# Patient Record
Sex: FEMALE | Race: WHITE | NOT HISPANIC OR LATINO
[De-identification: names, ages, dates, MRNs, and addresses within clinical notes are randomized per-mention and may not be internally consistent; named-entity substitution may affect disease eponyms.]

---

## 2017-07-26 ENCOUNTER — RX RENEWAL (OUTPATIENT)
Age: 77
End: 2017-07-26

## 2018-01-11 ENCOUNTER — EMERGENCY (EMERGENCY)
Facility: HOSPITAL | Age: 78
LOS: 1 days | Discharge: HOME | End: 2018-01-11
Attending: EMERGENCY MEDICINE | Admitting: EMERGENCY MEDICINE
Payer: MEDICARE

## 2018-01-11 VITALS
DIASTOLIC BLOOD PRESSURE: 78 MMHG | OXYGEN SATURATION: 100 % | HEART RATE: 86 BPM | SYSTOLIC BLOOD PRESSURE: 153 MMHG | RESPIRATION RATE: 17 BRPM

## 2018-01-11 VITALS
OXYGEN SATURATION: 99 % | SYSTOLIC BLOOD PRESSURE: 159 MMHG | RESPIRATION RATE: 18 BRPM | DIASTOLIC BLOOD PRESSURE: 77 MMHG | HEART RATE: 82 BPM | TEMPERATURE: 97 F

## 2018-01-11 DIAGNOSIS — Z96.659 PRESENCE OF UNSPECIFIED ARTIFICIAL KNEE JOINT: Chronic | ICD-10-CM

## 2018-01-11 DIAGNOSIS — Z96.643 PRESENCE OF ARTIFICIAL HIP JOINT, BILATERAL: Chronic | ICD-10-CM

## 2018-01-11 LAB
ALBUMIN SERPL ELPH-MCNC: 4.4 G/DL — SIGNIFICANT CHANGE UP (ref 3.3–5)
ALP SERPL-CCNC: 64 U/L — SIGNIFICANT CHANGE UP (ref 40–120)
ALT FLD-CCNC: 25 U/L — SIGNIFICANT CHANGE UP (ref 4–33)
APPEARANCE UR: CLEAR — SIGNIFICANT CHANGE UP
APTT BLD: 41.9 SEC — HIGH (ref 27.5–37.4)
AST SERPL-CCNC: 30 U/L — SIGNIFICANT CHANGE UP (ref 4–32)
B PERT DNA SPEC QL NAA+PROBE: SIGNIFICANT CHANGE UP
BASE EXCESS BLDV CALC-SCNC: 3.3 MMOL/L — SIGNIFICANT CHANGE UP
BASOPHILS # BLD AUTO: 0.04 K/UL — SIGNIFICANT CHANGE UP (ref 0–0.2)
BASOPHILS NFR BLD AUTO: 0.5 % — SIGNIFICANT CHANGE UP (ref 0–2)
BILIRUB SERPL-MCNC: 0.3 MG/DL — SIGNIFICANT CHANGE UP (ref 0.2–1.2)
BILIRUB UR-MCNC: NEGATIVE — SIGNIFICANT CHANGE UP
BLOOD GAS VENOUS - CREATININE: 0.75 MG/DL — SIGNIFICANT CHANGE UP (ref 0.5–1.3)
BLOOD UR QL VISUAL: NEGATIVE — SIGNIFICANT CHANGE UP
BUN SERPL-MCNC: 15 MG/DL — SIGNIFICANT CHANGE UP (ref 7–23)
C PNEUM DNA SPEC QL NAA+PROBE: NOT DETECTED — SIGNIFICANT CHANGE UP
CALCIUM SERPL-MCNC: 9.4 MG/DL — SIGNIFICANT CHANGE UP (ref 8.4–10.5)
CHLORIDE BLDV-SCNC: 110 MMOL/L — HIGH (ref 96–108)
CHLORIDE SERPL-SCNC: 107 MMOL/L — SIGNIFICANT CHANGE UP (ref 98–107)
CO2 SERPL-SCNC: 28 MMOL/L — SIGNIFICANT CHANGE UP (ref 22–31)
COLOR SPEC: YELLOW — SIGNIFICANT CHANGE UP
CREAT SERPL-MCNC: 0.86 MG/DL — SIGNIFICANT CHANGE UP (ref 0.5–1.3)
EOSINOPHIL # BLD AUTO: 0.09 K/UL — SIGNIFICANT CHANGE UP (ref 0–0.5)
EOSINOPHIL NFR BLD AUTO: 1.2 % — SIGNIFICANT CHANGE UP (ref 0–6)
FLUAV H1 2009 PAND RNA SPEC QL NAA+PROBE: NOT DETECTED — SIGNIFICANT CHANGE UP
FLUAV H1 RNA SPEC QL NAA+PROBE: NOT DETECTED — SIGNIFICANT CHANGE UP
FLUAV H3 RNA SPEC QL NAA+PROBE: NOT DETECTED — SIGNIFICANT CHANGE UP
FLUAV SUBTYP SPEC NAA+PROBE: SIGNIFICANT CHANGE UP
FLUBV RNA SPEC QL NAA+PROBE: NOT DETECTED — SIGNIFICANT CHANGE UP
GAS PNL BLDV: 140 MMOL/L — SIGNIFICANT CHANGE UP (ref 136–146)
GLUCOSE BLDV-MCNC: 88 — SIGNIFICANT CHANGE UP (ref 70–99)
GLUCOSE SERPL-MCNC: 88 MG/DL — SIGNIFICANT CHANGE UP (ref 70–99)
GLUCOSE UR-MCNC: NEGATIVE — SIGNIFICANT CHANGE UP
HADV DNA SPEC QL NAA+PROBE: NOT DETECTED — SIGNIFICANT CHANGE UP
HCO3 BLDV-SCNC: 25 MMOL/L — SIGNIFICANT CHANGE UP (ref 20–27)
HCOV 229E RNA SPEC QL NAA+PROBE: NOT DETECTED — SIGNIFICANT CHANGE UP
HCOV HKU1 RNA SPEC QL NAA+PROBE: NOT DETECTED — SIGNIFICANT CHANGE UP
HCOV NL63 RNA SPEC QL NAA+PROBE: NOT DETECTED — SIGNIFICANT CHANGE UP
HCOV OC43 RNA SPEC QL NAA+PROBE: NOT DETECTED — SIGNIFICANT CHANGE UP
HCT VFR BLD CALC: 39.5 % — SIGNIFICANT CHANGE UP (ref 34.5–45)
HCT VFR BLDV CALC: 40.3 % — SIGNIFICANT CHANGE UP (ref 34.5–45)
HGB BLD-MCNC: 12.9 G/DL — SIGNIFICANT CHANGE UP (ref 11.5–15.5)
HGB BLDV-MCNC: 13.1 G/DL — SIGNIFICANT CHANGE UP (ref 11.5–15.5)
HMPV RNA SPEC QL NAA+PROBE: NOT DETECTED — SIGNIFICANT CHANGE UP
HPIV1 RNA SPEC QL NAA+PROBE: NOT DETECTED — SIGNIFICANT CHANGE UP
HPIV2 RNA SPEC QL NAA+PROBE: NOT DETECTED — SIGNIFICANT CHANGE UP
HPIV3 RNA SPEC QL NAA+PROBE: NOT DETECTED — SIGNIFICANT CHANGE UP
HPIV4 RNA SPEC QL NAA+PROBE: NOT DETECTED — SIGNIFICANT CHANGE UP
IMM GRANULOCYTES # BLD AUTO: 0.03 # — SIGNIFICANT CHANGE UP
IMM GRANULOCYTES NFR BLD AUTO: 0.4 % — SIGNIFICANT CHANGE UP (ref 0–1.5)
INR BLD: 1.96 — HIGH (ref 0.88–1.17)
KETONES UR-MCNC: NEGATIVE — SIGNIFICANT CHANGE UP
LACTATE BLDV-MCNC: 0.9 MMOL/L — SIGNIFICANT CHANGE UP (ref 0.5–2)
LEUKOCYTE ESTERASE UR-ACNC: HIGH
LYMPHOCYTES # BLD AUTO: 1.45 K/UL — SIGNIFICANT CHANGE UP (ref 1–3.3)
LYMPHOCYTES # BLD AUTO: 18.6 % — SIGNIFICANT CHANGE UP (ref 13–44)
M PNEUMO DNA SPEC QL NAA+PROBE: NOT DETECTED — SIGNIFICANT CHANGE UP
MAGNESIUM SERPL-MCNC: 1.9 MG/DL — SIGNIFICANT CHANGE UP (ref 1.6–2.6)
MCHC RBC-ENTMCNC: 29.9 PG — SIGNIFICANT CHANGE UP (ref 27–34)
MCHC RBC-ENTMCNC: 32.7 % — SIGNIFICANT CHANGE UP (ref 32–36)
MCV RBC AUTO: 91.4 FL — SIGNIFICANT CHANGE UP (ref 80–100)
MONOCYTES # BLD AUTO: 0.48 K/UL — SIGNIFICANT CHANGE UP (ref 0–0.9)
MONOCYTES NFR BLD AUTO: 6.2 % — SIGNIFICANT CHANGE UP (ref 2–14)
NEUTROPHILS # BLD AUTO: 5.69 K/UL — SIGNIFICANT CHANGE UP (ref 1.8–7.4)
NEUTROPHILS NFR BLD AUTO: 73.1 % — SIGNIFICANT CHANGE UP (ref 43–77)
NITRITE UR-MCNC: NEGATIVE — SIGNIFICANT CHANGE UP
NON-SQ EPI CELLS # UR AUTO: <1 — SIGNIFICANT CHANGE UP
NRBC # FLD: 0 — SIGNIFICANT CHANGE UP
PCO2 BLDV: 53 MMHG — HIGH (ref 41–51)
PH BLDV: 7.35 PH — SIGNIFICANT CHANGE UP (ref 7.32–7.43)
PH UR: 7 — SIGNIFICANT CHANGE UP (ref 4.6–8)
PHOSPHATE SERPL-MCNC: 3.3 MG/DL — SIGNIFICANT CHANGE UP (ref 2.5–4.5)
PLATELET # BLD AUTO: 240 K/UL — SIGNIFICANT CHANGE UP (ref 150–400)
PMV BLD: 9.7 FL — SIGNIFICANT CHANGE UP (ref 7–13)
PO2 BLDV: 28 MMHG — LOW (ref 35–40)
POTASSIUM BLDV-SCNC: 3.9 MMOL/L — SIGNIFICANT CHANGE UP (ref 3.4–4.5)
POTASSIUM SERPL-MCNC: 4.4 MMOL/L — SIGNIFICANT CHANGE UP (ref 3.5–5.3)
POTASSIUM SERPL-SCNC: 4.4 MMOL/L — SIGNIFICANT CHANGE UP (ref 3.5–5.3)
PROT SERPL-MCNC: 6.6 G/DL — SIGNIFICANT CHANGE UP (ref 6–8.3)
PROT UR-MCNC: NEGATIVE MG/DL — SIGNIFICANT CHANGE UP
PROTHROM AB SERPL-ACNC: 22.9 SEC — HIGH (ref 9.8–13.1)
RBC # BLD: 4.32 M/UL — SIGNIFICANT CHANGE UP (ref 3.8–5.2)
RBC # FLD: 13 % — SIGNIFICANT CHANGE UP (ref 10.3–14.5)
RBC CASTS # UR COMP ASSIST: SIGNIFICANT CHANGE UP (ref 0–?)
RSV RNA SPEC QL NAA+PROBE: NOT DETECTED — SIGNIFICANT CHANGE UP
RV+EV RNA SPEC QL NAA+PROBE: NOT DETECTED — SIGNIFICANT CHANGE UP
SAO2 % BLDV: 45.2 % — LOW (ref 60–85)
SODIUM SERPL-SCNC: 145 MMOL/L — SIGNIFICANT CHANGE UP (ref 135–145)
SP GR SPEC: 1.02 — SIGNIFICANT CHANGE UP (ref 1–1.04)
SQUAMOUS # UR AUTO: SIGNIFICANT CHANGE UP
TSH SERPL-MCNC: 1.85 UIU/ML — SIGNIFICANT CHANGE UP (ref 0.27–4.2)
UROBILINOGEN FLD QL: NORMAL MG/DL — SIGNIFICANT CHANGE UP
WBC # BLD: 7.78 K/UL — SIGNIFICANT CHANGE UP (ref 3.8–10.5)
WBC # FLD AUTO: 7.78 K/UL — SIGNIFICANT CHANGE UP (ref 3.8–10.5)
WBC UR QL: SIGNIFICANT CHANGE UP (ref 0–?)

## 2018-01-11 PROCEDURE — 70450 CT HEAD/BRAIN W/O DYE: CPT | Mod: 26

## 2018-01-11 PROCEDURE — 71045 X-RAY EXAM CHEST 1 VIEW: CPT | Mod: 26

## 2018-01-11 PROCEDURE — 72125 CT NECK SPINE W/O DYE: CPT | Mod: 26

## 2018-01-11 PROCEDURE — 99285 EMERGENCY DEPT VISIT HI MDM: CPT | Mod: GC

## 2018-01-11 NOTE — ED ADULT TRIAGE NOTE - CHIEF COMPLAINT QUOTE
pt. states she felt an episode of weakness while standing at the sink at about 7am, states she sat down, felt tingling on the L side of her face and down the L arm.  Did not fall or LOC.  PMHx TIA's, HLD, HTN.  No arm drift, facial droop, slurred speech noted in triage.  Pt. on coumadin for afib.  Stroke eval conducted by MD Cabot in triage, no code stroke called. pt. states she felt an episode of weakness while standing at the sink at about 7am, states she sat down, felt tingling on the L side of her face and down the L arm.  Did not fall or LOC.  PMHx TIA's, HLD, HTN.  No arm drift, facial droop, slurred speech noted in triage.  Pt. on coumadin for afib.  Stroke eval conducted by MD Cabot in triage, no code stroke called.  .

## 2018-01-11 NOTE — ED PROVIDER NOTE - MEDICAL DECISION MAKING DETAILS
77F with L face tingling, L arm tingling, headache, generalized weakness.  Symptoms resolving.  On coumadin.  No code stroke called.  TPA contraindications.  Feels well otherwise.  H/o similar symptoms in past deemed likely c-spine radiculopathy.  Will get ct head, cbc, cmp, vbg, coags, ua, cxr, ekg.  Will d/w curtis and primary medical doctor.

## 2018-01-11 NOTE — ED PROVIDER NOTE - OBJECTIVE STATEMENT
77F with PMH Hypertension, hl, afib on coumadin, h/o multiple episodes of L facial tingling and L arm tingling in the past, negative stroke workups, private neurologist has told the patient he feels this is more likely related to cervical spinal issues than a TIA, who presents with generalized weakness since 7:50, L facial and L arm tingling sensation since 8:10 that are now resolving.  Also complaining of diffuse 5/10 headache that is now resolving as well.  Had same symptoms last time came to ED with negative CVA lam.  Also complaining of burning with urination.  Denies vision change, speech change focal weakness, fever, chills, chest pain, shortness of breath, cough, palpitations, abdominal pain, diarrhea, constipation, n/v.    primary medical doctor: Brunner [phone]  Neuro: Calvin Bush

## 2018-01-11 NOTE — ED PROVIDER NOTE - PLAN OF CARE
1) You were here for tingling and weakness.    2) Take your current medications as prescribed.     3) Follow up with your primary doctor and your neurologist for further evaluation and to answer any questions you have.    4) Return to the emergency department if you experience worsening symptoms, pain, fever, chills, nausea, vomiting or other concerning symptoms.

## 2018-01-11 NOTE — ED ADULT NURSE NOTE - OBJECTIVE STATEMENT
Pt. A&Ox4, c/o generalized weakness starting around 7:45am today. Pt. states she was finishing breakfast, stood up to walk and felt like she had to sit back down otherwise she would fall. Denies any dizziness, cp or sob at the time. No LOC. c/o mild headache at this time and "just doesn't feel herself". Able to ambulate to bathroom but with some assistance.  h/o HTN, HLD and TIA (no residual) #20g IV placed in left AC, labs sent. MD at bedside for eval. VS done as charted, breathing well on RA. Will continue to monitor.

## 2018-01-11 NOTE — ED PROVIDER NOTE - ATTENDING CONTRIBUTION TO CARE
I performed a face to face bedside interview with patient regarding history of present illness, review of symptoms and past medical history. I completed an independent physical exam.  I have discussed patient's plan of care.   I agree with note as stated above, having amended the EMR as needed to reflect my findings. I have discussed the assessment and plan of care.  This includes during the time I functioned as the attending physician for this patient.  Attending Contribution to Care: agree with plan of resident. pt p/w vague weakness, recurrent hx of paresthesia. attributed to cervical stenosis. pt labs wnl. ct head unchanged. pt stable. symptomatic improvement. no current paresthesia. will f/u with neuro and pmd. stable for d/c.

## 2018-01-11 NOTE — ED PROVIDER NOTE - CARE PLAN
Principal Discharge DX:	Paresthesia Principal Discharge DX:	Paresthesia  Instructions for follow-up, activity and diet:	1) You were here for tingling and weakness.    2) Take your current medications as prescribed.     3) Follow up with your primary doctor and your neurologist for further evaluation and to answer any questions you have.    4) Return to the emergency department if you experience worsening symptoms, pain, fever, chills, nausea, vomiting or other concerning symptoms.

## 2018-01-11 NOTE — ED PROVIDER NOTE - PROGRESS NOTE DETAILS
Cabot: Called to triage to eval possible stroke.  77F with PMH of multiple episodes of L forehead tingling and L arm tingling in the past, negative stroke workups, private neurologist has told the patient he feels this is more likely related to cervical spinal issues than a TIA, also on coumadin for Afib, who came in with similar symptoms that started at 8am.  Reports tingling to the L forehead and L shoulder, already improving.  No vision changes, speech changes, HA, N/V, weakness.  On exam, she is alert and conversant, PERRLA, no nystagmus, symmetric facial movements, no numbness of any part of body on exam (including face and arm), 5/5 strength.  Unlikely stroke.  Also, with resolving complaints and on coumadin - both contraindications to tPA.  Will not call code stroke.

## 2018-01-11 NOTE — ED ADULT NURSE NOTE - CHIEF COMPLAINT QUOTE
pt. states she felt an episode of weakness while standing at the sink at about 7am, states she sat down, felt tingling on the L side of her face and down the L arm.  Did not fall or LOC.  PMHx TIA's, HLD, HTN.  No arm drift, facial droop, slurred speech noted in triage.  Pt. on coumadin for afib.  Stroke eval conducted by MD Cabot in triage, no code stroke called.  .

## 2018-01-12 LAB
BACTERIA UR CULT: SIGNIFICANT CHANGE UP
SPECIMEN SOURCE: SIGNIFICANT CHANGE UP
SPECIMEN SOURCE: SIGNIFICANT CHANGE UP

## 2018-01-16 LAB — BACTERIA BLD CULT: SIGNIFICANT CHANGE UP

## 2018-12-06 NOTE — ED ADULT TRIAGE NOTE - AS TEMP SITE
oral Bilateral Helical Rim Advancement Flap Text: The defect edges were debeveled with a #15 blade scalpel.  Given the location of the defect and the proximity to free margins (helical rim) a bilateral helical rim advancement flap was deemed most appropriate.  Using a sterile surgical marker, the appropriate advancement flaps were drawn incorporating the defect and placing the expected incisions between the helical rim and antihelix where possible.  The area thus outlined was incised through and through with a #15 scalpel blade.  With a skin hook and iris scissors, the flaps were gently and sharply undermined and freed up.

## 2019-02-05 ENCOUNTER — RX RENEWAL (OUTPATIENT)
Age: 79
End: 2019-02-05

## 2019-09-14 ENCOUNTER — EMERGENCY (EMERGENCY)
Facility: HOSPITAL | Age: 79
LOS: 1 days | Discharge: HOME | End: 2019-09-14
Attending: STUDENT IN AN ORGANIZED HEALTH CARE EDUCATION/TRAINING PROGRAM | Admitting: STUDENT IN AN ORGANIZED HEALTH CARE EDUCATION/TRAINING PROGRAM
Payer: MEDICARE

## 2019-09-14 VITALS
HEART RATE: 79 BPM | TEMPERATURE: 98 F | DIASTOLIC BLOOD PRESSURE: 82 MMHG | SYSTOLIC BLOOD PRESSURE: 132 MMHG | RESPIRATION RATE: 16 BRPM | OXYGEN SATURATION: 99 %

## 2019-09-14 VITALS
TEMPERATURE: 98 F | SYSTOLIC BLOOD PRESSURE: 117 MMHG | OXYGEN SATURATION: 98 % | HEART RATE: 68 BPM | RESPIRATION RATE: 18 BRPM | DIASTOLIC BLOOD PRESSURE: 65 MMHG

## 2019-09-14 DIAGNOSIS — Z96.643 PRESENCE OF ARTIFICIAL HIP JOINT, BILATERAL: Chronic | ICD-10-CM

## 2019-09-14 DIAGNOSIS — Z96.659 PRESENCE OF UNSPECIFIED ARTIFICIAL KNEE JOINT: Chronic | ICD-10-CM

## 2019-09-14 PROCEDURE — 93010 ELECTROCARDIOGRAM REPORT: CPT

## 2019-09-14 PROCEDURE — 99284 EMERGENCY DEPT VISIT MOD MDM: CPT | Mod: 25,GC

## 2019-09-14 PROCEDURE — 71046 X-RAY EXAM CHEST 2 VIEWS: CPT | Mod: 26

## 2019-09-14 NOTE — ED ADULT NURSE NOTE - OBJECTIVE STATEMENT
Pt c/o weakness in her legs and pain in rt shoulder and swollen calves--she just doesn't feel right. Pt appears with no weakness--walked to bathroom with no difficulty noted. Pt c/o urinary frequency and burning. Pt had #22 placed lt hand--labs drawn and sent .Pt placed on cardiac monitor in NSR with no ectopy noted--v/s stable afebrile. Pt alert and orientated x3

## 2019-09-14 NOTE — ED PROVIDER NOTE - PHYSICAL EXAMINATION
PGY2/MD Mireya.   VITALS: reviewed  GEN: No apparent distress, A & O x 4  HEAD/EYES: NC/AT, PERRL, EOMI, anicteric sclerae, no conjunctival pallor  ENT: mucus membranes moist, oropharynx WNL, trachea midline, no JVD, neck is supple, b/l leg swelling, no pittinge edema, negative to Felipe's sing.  RESP: lungs CTA with equal breath sounds bilaterally, chest wall nontender and atraumatic  CV: heart with reg rhythm S1, S2, no murmur; distal pulses intact and symmetric bilaterally  ABDOMEN: normoactive bowel sounds, soft, nondistended, nontender, no palpable masses  : no CVAT  MSK: extremities atraumatic and nontender, no edema, no asymmetry.  SKIN: warm, dry, no rash, no bruising, no cyanosis. color appropriate for ethnicity  NEURO: alert, mentating appropriately, no facial asymmetry.  PSYCH: Affect appropriate

## 2019-09-14 NOTE — ED PROVIDER NOTE - ATTENDING CONTRIBUTION TO CARE
80 yo female presens to ED for evaluation of bilateral swellling and cough. Patient is well appearing. MDM : Will check labs, EKG, imaging, medicate, reassess

## 2019-09-14 NOTE — ED PROVIDER NOTE - OBJECTIVE STATEMENT
PGY2/MD Mireya. 80 yo F with PMH Hypertension, hl, afib on coumadin, h/o multiple episodes of L facial tingling and L arm tingling in the past, negative stroke workups, private neurologist has told the patient he feels this is more likely related to cervical spinal issues, who presented to the ED today for b/l swelling and cough. She's been coughing, dry, no blood, x 4 wks, no fever, no sick contact or travel. Also, started b/l leg swelling, x 2 wsk, worsening towards the evening. No recetn immobilization, No calf pain or recent travel. No hormone meds use or h/o cancer. Denies chest pain or sob.

## 2019-09-14 NOTE — ED PROVIDER NOTE - CLINICAL SUMMARY MEDICAL DECISION MAKING FREE TEXT BOX
PGY2/MD Mireya. 78 yo F p/w b/l swelling, Well's score for DVT zero points, not likley DVT. No erythematous or warmth, or trauma. Not likely cellulitis. clinical course more likely stasis but will r/o CHF given long term afib and dry cough. No sob or fever, not clinically consistent with infectious.

## 2019-09-14 NOTE — ED PROVIDER NOTE - PROGRESS NOTE DETAILS
PGY2/MD Mireya. pt feels better, BNP negative to CHF, Xray clear. Has cardiologist follow up on Monday. I have given the pt strict return and follow up precautions. The patient has been provided with a copy of all pertinent results. The patient has been informed of all concerning signs and symptoms to return to Emergency Department, the necessity to follow up with PMD/Clinic/follow up provided within 2-3 days was explained, and the patient reports understanding of above with capacity and insight.

## 2019-09-14 NOTE — ED ADULT NURSE REASSESSMENT NOTE - NS ED NURSE REASSESS COMMENT FT1
received report from guille SYED. Pt is a/o x 3. no complaints of chest pain, headache, nausea, dizzniness, vomiting, SOB, fever, chills   verbalized. Pt has 22g iv placed to left wrist with no redness or swelling noted. Pt on cardiac monitor in NSR. Awaiting further orders. Will continue to monitor.

## 2019-09-14 NOTE — ED PROVIDER NOTE - PATIENT PORTAL LINK FT
You can access the FollowMyHealth Patient Portal offered by [facility] by registering at the following website: [email or web address]. By joining [facility]’s FollowMyHealth portal, you will also be able to view your health information using other applications (apps) compatible with our system.

## 2019-09-14 NOTE — ED ADULT TRIAGE NOTE - CHIEF COMPLAINT QUOTE
Patient states her legs have swelling during the day  but it goes down at  night. Cough x 1-2 weeks, left arm feels weak, " every thing feels weak" . I don't feel like my legs will hold me. No chest pain , no sob. Symptoms started 2 pm today.  Patient table to ambulate and move all extremities without difficulty.

## 2019-09-14 NOTE — ED ADULT NURSE NOTE - NSIMPLEMENTINTERV_GEN_ALL_ED
Implemented All Universal Safety Interventions:  [place] to call system. Call bell, personal items and telephone within reach. Instruct patient to call for assistance. Room bathroom lighting operational. Non-slip footwear when patient is off stretcher. Physically safe environment: no spills, clutter or unnecessary equipment. Stretcher in lowest position, wheels locked, appropriate side rails in place.

## 2019-09-14 NOTE — ED PROVIDER NOTE - NSFOLLOWUPINSTRUCTIONS_ED_ALL_ED_FT
You had a thorough evaluation including an exam, labs and imaging.  1. You were seen for lower leg swelling, no signs of heart failure. A copy of your resulted labs, imaging, and findings have been provided to you.  2. Follow up with your primary care doctor within 48 hours. Please call [phone] to make an appointment or with any questions you may have.  or call [phone] to make an appointment with the clinic.  3. Return immediately to the emergency department for new, persistent, or worsening symptoms or signs. Return immediately to the emergency department if you have chest pain, shortness of breath, loss of consciousness, or any other new concerns.

## 2019-09-17 NOTE — ED POST DISCHARGE NOTE - RESULT SUMMARY
UCX: stapursula caog 10,000-49,000CFU/ml . No antibiotic listed in ED provider note or prescription writer at time of discharge. Patient elderly. Patient contact # [phone] S/W patient's  and discussed with  that patient needs a repeat UA/UCX. with pMD. According to  patient feeling well. Return precautions given. to patient's .

## 2019-11-21 ENCOUNTER — APPOINTMENT (OUTPATIENT)
Dept: VASCULAR SURGERY | Facility: CLINIC | Age: 79
End: 2019-11-21
Payer: MEDICARE

## 2019-11-21 VITALS — SYSTOLIC BLOOD PRESSURE: 120 MMHG | HEART RATE: 80 BPM | DIASTOLIC BLOOD PRESSURE: 80 MMHG

## 2019-11-21 DIAGNOSIS — I48.0 PAROXYSMAL ATRIAL FIBRILLATION: ICD-10-CM

## 2019-11-21 DIAGNOSIS — Z86.79 PERSONAL HISTORY OF OTHER DISEASES OF THE CIRCULATORY SYSTEM: ICD-10-CM

## 2019-11-21 DIAGNOSIS — Z82.5 FAMILY HISTORY OF ASTHMA AND OTHER CHRONIC LOWER RESPIRATORY DISEASES: ICD-10-CM

## 2019-11-21 DIAGNOSIS — I83.93 ASYMPTOMATIC VARICOSE VEINS OF BILATERAL LOWER EXTREMITIES: ICD-10-CM

## 2019-11-21 DIAGNOSIS — M19.90 UNSPECIFIED OSTEOARTHRITIS, UNSPECIFIED SITE: ICD-10-CM

## 2019-11-21 DIAGNOSIS — Z82.49 FAMILY HISTORY OF ISCHEMIC HEART DISEASE AND OTHER DISEASES OF THE CIRCULATORY SYSTEM: ICD-10-CM

## 2019-11-21 PROCEDURE — 99203 OFFICE O/P NEW LOW 30 MIN: CPT

## 2019-11-25 PROBLEM — Z82.5 FAMILY HISTORY OF EMPHYSEMA: Status: ACTIVE | Noted: 2019-11-25

## 2019-11-25 PROBLEM — I48.0 PAROXYSMAL ATRIAL FIBRILLATION: Status: RESOLVED | Noted: 2019-11-25 | Resolved: 2019-11-25

## 2019-11-25 PROBLEM — Z82.49 FAMILY HISTORY OF CARDIAC DISORDER: Status: ACTIVE | Noted: 2019-11-25

## 2019-11-25 PROBLEM — M19.90 OSTEOARTHRITIS: Status: RESOLVED | Noted: 2019-11-25 | Resolved: 2019-11-25

## 2019-11-25 PROBLEM — I83.93 ASYMPTOMATIC VARICOSE VEINS OF BOTH LOWER EXTREMITIES: Status: ACTIVE | Noted: 2019-11-25

## 2019-11-25 PROBLEM — Z86.79 HISTORY OF HEART VALVE ABNORMALITY: Status: RESOLVED | Noted: 2019-11-25 | Resolved: 2019-11-25

## 2019-11-25 RX ORDER — FENOFIBRATE 145 MG/1
145 TABLET ORAL
Refills: 0 | Status: ACTIVE | COMMUNITY

## 2019-11-25 RX ORDER — ATORVASTATIN CALCIUM 80 MG/1
80 TABLET, FILM COATED ORAL
Refills: 0 | Status: ACTIVE | COMMUNITY

## 2019-11-25 RX ORDER — EZETIMIBE 10 MG/1
10 TABLET ORAL
Refills: 0 | Status: ACTIVE | COMMUNITY

## 2019-11-25 RX ORDER — ASPIRIN 81 MG
81 TABLET, DELAYED RELEASE (ENTERIC COATED) ORAL
Refills: 0 | Status: ACTIVE | COMMUNITY

## 2019-11-25 RX ORDER — OLMESARTAN MEDOXOMIL 20 MG/1
20 TABLET, FILM COATED ORAL
Refills: 0 | Status: ACTIVE | COMMUNITY

## 2019-11-25 RX ORDER — CHROMIUM 200 MCG
TABLET ORAL
Refills: 0 | Status: ACTIVE | COMMUNITY

## 2019-11-25 RX ORDER — METOPROLOL SUCCINATE 25 MG/1
25 TABLET, EXTENDED RELEASE ORAL
Refills: 0 | Status: ACTIVE | COMMUNITY

## 2020-07-18 ENCOUNTER — LABORATORY RESULT (OUTPATIENT)
Age: 80
End: 2020-07-18

## 2021-04-21 ENCOUNTER — NON-APPOINTMENT (OUTPATIENT)
Age: 81
End: 2021-04-21

## 2021-04-21 ENCOUNTER — APPOINTMENT (OUTPATIENT)
Dept: GASTROENTEROLOGY | Facility: CLINIC | Age: 81
End: 2021-04-21
Payer: MEDICARE

## 2021-04-21 VITALS
WEIGHT: 144 LBS | HEIGHT: 65 IN | OXYGEN SATURATION: 97 % | TEMPERATURE: 98 F | SYSTOLIC BLOOD PRESSURE: 142 MMHG | HEART RATE: 80 BPM | BODY MASS INDEX: 23.99 KG/M2 | DIASTOLIC BLOOD PRESSURE: 83 MMHG

## 2021-04-21 PROCEDURE — 99213 OFFICE O/P EST LOW 20 MIN: CPT

## 2021-04-21 PROCEDURE — 99072 ADDL SUPL MATRL&STAF TM PHE: CPT

## 2021-04-21 NOTE — HISTORY OF PRESENT ILLNESS
[de-identified] : GERD\par \par Aches since Covid vaccine 2 weeks ago \par \par Tingling \par \par CXRY - OK \par \par EKG - OK \par \par Seeing CV/ Neuro tomm \par \par

## 2021-04-21 NOTE — ASSESSMENT
[FreeTextEntry1] : ER any changes\par \par A low acid / reflux diet was discussed in great detail including  not smoking, not drinking alcohol, and not consuming foods that irritate the esophagus. It is helpful to eat small meals throughout the day instead of large meals. You should avoid eating before bedtime or lying down after you eat. It can be helpful to raise the head of your bed six inches. Additionally, you should maintain a healthy weight and good posture.. The patient was given written material to take home and review.\par \par F/u with consultants\par \par

## 2021-04-21 NOTE — PHYSICAL EXAM
[General Appearance - Alert] : alert [General Appearance - In No Acute Distress] : in no acute distress [Sclera] : the sclera and conjunctiva were normal [Extraocular Movements] : extraocular movements were intact [PERRL With Normal Accommodation] : pupils were equal in size, round, and reactive to light [Outer Ear] : the ears and nose were normal in appearance [Oropharynx] : the oropharynx was normal [Neck Appearance] : the appearance of the neck was normal [Neck Cervical Mass (___cm)] : no neck mass was observed [Jugular Venous Distention Increased] : there was no jugular-venous distention [Thyroid Diffuse Enlargement] : the thyroid was not enlarged [Thyroid Nodule] : there were no palpable thyroid nodules [Auscultation Breath Sounds / Voice Sounds] : lungs were clear to auscultation bilaterally [Heart Rate And Rhythm] : heart rate was normal and rhythm regular [Heart Sounds] : normal S1 and S2 [Heart Sounds Gallop] : no gallops [Murmurs] : no murmurs [Heart Sounds Pericardial Friction Rub] : no pericardial rub [Normal] : normal [Soft, Nontender] : the abdomen was soft and nontender [No Mass] : no masses were palpated [No HSM] : no hepatosplenomegaly noted [No CVA Tenderness] : no ~M costovertebral angle tenderness [No Spinal Tenderness] : no spinal tenderness [Abnormal Walk] : normal gait [Nail Clubbing] : no clubbing  or cyanosis of the fingernails [Musculoskeletal - Swelling] : no joint swelling seen [Motor Tone] : muscle strength and tone were normal [Skin Color & Pigmentation] : normal skin color and pigmentation [Skin Turgor] : normal skin turgor [] : no rash [Deep Tendon Reflexes (DTR)] : deep tendon reflexes were 2+ and symmetric [Sensation] : the sensory exam was normal to light touch and pinprick [No Focal Deficits] : no focal deficits [Oriented To Time, Place, And Person] : oriented to person, place, and time [Impaired Insight] : insight and judgment were intact [Affect] : the affect was normal

## 2021-04-22 LAB
25(OH)D3 SERPL-MCNC: 28.1 NG/ML
ALBUMIN SERPL ELPH-MCNC: 4.5 G/DL
ALP BLD-CCNC: 69 U/L
ALT SERPL-CCNC: 33 U/L
ANION GAP SERPL CALC-SCNC: 11 MMOL/L
AST SERPL-CCNC: 37 U/L
BILIRUB SERPL-MCNC: 0.3 MG/DL
BUN SERPL-MCNC: 17 MG/DL
CALCIUM SERPL-MCNC: 10.7 MG/DL
CHLORIDE SERPL-SCNC: 106 MMOL/L
CO2 SERPL-SCNC: 26 MMOL/L
CREAT SERPL-MCNC: 0.82 MG/DL
GLUCOSE SERPL-MCNC: 111 MG/DL
POTASSIUM SERPL-SCNC: 4.6 MMOL/L
PROT SERPL-MCNC: 6.7 G/DL
SODIUM SERPL-SCNC: 143 MMOL/L

## 2021-06-02 ENCOUNTER — TRANSCRIPTION ENCOUNTER (OUTPATIENT)
Age: 81
End: 2021-06-02

## 2021-07-29 NOTE — ED ADULT NURSE NOTE - HARM RISK FACTORS
INTERNAL MED DISCHARGE  NOTE    Urvashi Staley is a 83 year old female patient.    Subjective:    Seen this am  No issues; very pleasant; poor po intake per nurse  [facility] unable to get papers signed today , plan to get this done with SDM at the nursing home, appt already set up per SW.    Vitals stable  She Denies pain    Objective:    Current Vitals:  Visit Vitals  /78   Pulse 75   Temp 98.2 °F (36.8 °C) (Oral)   Resp 18   Ht 5' 6\" (1.676 m)   Wt 44.1 kg (97 lb 3.6 oz)   SpO2 96%   BMI 15.69 kg/m²       Constitutional: a&O to self   HEENT: no icterus, no thrush. Om moist  Cardiovascular: regular  Pulmonary/Chest: clear b/l   Abdominal: Soft. Tender in the lower quadrants ; no guarding;  ND  Extr :  1+ pitting pedal  edema.no cyanosis    A/P:    1) Klebsiella uti : completed 5 days of rocephin    2) Dehydration on admission : corrected with ivf    3) acute on chronic hypotension sec to volume depletion on admission : resolved with ivf; continue midodrine.    4) Left adnexal cystic/solid mass : gyne rec gyne oncologist at [facility] if further mgmt pursued    5) Multiple b/l renal calculi with mild right hydronephrosis . Nonobstructive    6) mild Chronic anemia     7) h/o DM-2 but she has been off meds and sugars at goal    8) severe protein malnutrition     9) Adult failure to thrive with BMI 15. On marinol. Variable po intake.    10) leucocytosis , reactive    Stop aspirin    Pt is DNR.  SDM Ms Zuniga has made this decision and also does not want any further w/u for the pelvic mass . I agree with her decision , given pt's age comorbidities, any further aggressive interventions will not change the overall outcome and she is a poor candidate for surgery/oncology w/u at this time.   SDM would like comfort care and hospice services for pt. [facility] to service the pt at the nursing home; stable for discharge; d/w nurse and SW.       I/O  Summary:    Intake/Output Summary (Last 24 hours) at 7/28/2021 2138  Last data filed at 7/28/2021 0800  Gross per 24 hour   Intake 60 ml   Output --   Net 60 ml       Admit Weight: 44.1 kg (97 lb 3.6 oz)   Current Weight: 44.1 kg (97 lb 3.6 oz)    Medications:  Continuous Medications:      Scheduled Medications:      PRN Medications      LABS:   Recent Labs   Lab 07/24/21  0538 07/23/21  0459 07/22/21  0834   SODIUM 142 140 141   CHLORIDE 107 107 109*   CO2 28 27 29   BUN 5* 6 11   CREATININE 0.30* 0.28* 0.33*   CALCIUM 8.2* 8.3* 8.2*   GLUCOSE 120* 92 115*     Recent Labs   Lab 07/27/21  0850   WBC 11.0   RBC 3.97*   HGB 10.5*   HCT 34.4*        No results found  Results for orders placed or performed during the hospital encounter of 07/19/21   Electrocardiogram 12-Lead   Result Value Ref Range    Ventricular Rate EKG/Min (BPM) 87     Atrial Rate (BPM) 87     WI-Interval (MSEC) 190     QRS-Interval (MSEC) 62     QT-Interval (MSEC) 344     QTc 414     P Axis (Degrees) 103     R Axis (Degrees) -31     T Axis (Degrees) -3     REPORT TEXT       Normal sinus rhythm  Left axis deviation  Pulmonary disease pattern  Inferior infarct  , age undetermined  Abnormal ECG  No previous ECGs available  Confirmed by JORDY MACIAS MD ([id number]) on 7/19/2021 5:04:58 PM             Ann Hernandez MD   7/28/2021 9:38 PM     yes

## 2021-11-10 ENCOUNTER — APPOINTMENT (OUTPATIENT)
Dept: PULMONOLOGY | Facility: CLINIC | Age: 81
End: 2021-11-10
Payer: MEDICARE

## 2021-11-10 VITALS
DIASTOLIC BLOOD PRESSURE: 75 MMHG | TEMPERATURE: 98.8 F | HEART RATE: 83 BPM | OXYGEN SATURATION: 95 % | SYSTOLIC BLOOD PRESSURE: 121 MMHG

## 2021-11-10 DIAGNOSIS — Z00.00 ENCOUNTER FOR GENERAL ADULT MEDICAL EXAMINATION W/OUT ABNORMAL FINDINGS: ICD-10-CM

## 2021-11-10 DIAGNOSIS — Z86.73 PERSONAL HISTORY OF TRANSIENT ISCHEMIC ATTACK (TIA), AND CEREBRAL INFARCTION W/OUT RESIDUAL DEFICITS: ICD-10-CM

## 2021-11-10 DIAGNOSIS — Z23 ENCOUNTER FOR IMMUNIZATION: ICD-10-CM

## 2021-11-10 DIAGNOSIS — Z87.19 PERSONAL HISTORY OF OTHER DISEASES OF THE DIGESTIVE SYSTEM: ICD-10-CM

## 2021-11-10 DIAGNOSIS — Z13.820 ENCOUNTER FOR SCREENING FOR OSTEOPOROSIS: ICD-10-CM

## 2021-11-10 DIAGNOSIS — Z86.79 PERSONAL HISTORY OF OTHER DISEASES OF THE CIRCULATORY SYSTEM: ICD-10-CM

## 2021-11-10 DIAGNOSIS — I51.7 CARDIOMEGALY: ICD-10-CM

## 2021-11-10 LAB
ALBUMIN: 10
BILIRUB UR QL STRIP: NEGATIVE
CLARITY UR: CLEAR
COLLECTION METHOD: NORMAL
CREATININE: 100
GLUCOSE BLDC GLUCOMTR-MCNC: 122
GLUCOSE UR-MCNC: NEGATIVE
HBA1C MFR BLD HPLC: 6.2
HCG UR QL: 0.2 EU/DL
HGB UR QL STRIP.AUTO: NEGATIVE
KETONES UR-MCNC: NEGATIVE
LEUKOCYTE ESTERASE UR QL STRIP: NORMAL
MICROALBUMIN/CREAT UR TEST STR-RTO: <30
NITRITE UR QL STRIP: NEGATIVE
PH UR STRIP: 5.5
PROT UR STRIP-MCNC: NEGATIVE
SP GR UR STRIP: 1.02

## 2021-11-10 PROCEDURE — 71046 X-RAY EXAM CHEST 2 VIEWS: CPT

## 2021-11-10 PROCEDURE — 81003 URINALYSIS AUTO W/O SCOPE: CPT | Mod: QW

## 2021-11-10 PROCEDURE — 83036 HEMOGLOBIN GLYCOSYLATED A1C: CPT | Mod: QW

## 2021-11-10 PROCEDURE — 77085 DXA BONE DENSITY AXL VRT FX: CPT

## 2021-11-10 PROCEDURE — 99397 PER PM REEVAL EST PAT 65+ YR: CPT | Mod: 25

## 2021-11-10 PROCEDURE — 82044 UR ALBUMIN SEMIQUANTITATIVE: CPT | Mod: QW

## 2021-11-10 PROCEDURE — 82962 GLUCOSE BLOOD TEST: CPT

## 2021-11-10 NOTE — HISTORY OF PRESENT ILLNESS
[Never] : never [TextBox_4] : 81-year-old\par Well visit\par Data reviewed procedure as noted\par Feeling well\par No active complaints\par Does have some fatigue\par Occasional arthritic pain\par Able to perform normal daily activities of living\par No depression\par Eating well\par Weight stable appetite normal\par GI up-to-date\par Cardiology up-to-date reviewed data noted\par Past medical history TIA two thousand sixteen on Coumadin\par History of reflux not active\par History history hypertension\par History of LVH\par History of osteoarthritis\par History of paroxysmal atrial fibrillation on\par Hyperlipidemia on statin therapy\par Varicose veins asymptomatic\par \par States Covid vaccine protocol to dose Pfizer up-to-date\par Received flu shotfall 2021\par DTaP 5 years ago\par Question Shingrix\par Question pneumonia vaccine

## 2021-11-10 NOTE — PROCEDURE
[FreeTextEntry1] : POCT 11/10/21\par HGBA1C 6.2\par GLUcose 122\par \par DEXA scan November 10, 2020\par Left forearm osteoporosis\par Lumbar spine normal\par \par Chest x-ray PA lateral\par November 10, 2021\par Normal cardiac size\par Uncoiled aorta\par Levoscoliosis\par Clear lungs without parenchymal infiltrates pleural effusions or dominant pulmonary nodules\par \par Data review \par Last chest x-ray reviewed dating back to September 14, 2019\par indication was cough at that time\par impression clear lungs levoscoliosis\par degenerative changes of the shoulders and spine\par \par management was Frank Gaffney MD cardiology\par November 5 2021\par PT/INR 3.57- coumadin\par TSH 2.12\par CRP less than 3 normal range\par serum electrolytes\par serum sodium 145 potassium 5.1 serum chloride 107\par serum bicarb 23\par glucose 97\par BUN 21 creatinine 0.91\par serum calcium 10.0\par total protein 6.7 liver function testing normal\par alkaline phosphatase 60\par total bilirubin normal 0.5\par lipid profile\par direct LDL 80\par total cholesterol 165\par HDL 61\par triglycerides 118\par LDL 80\par CBC\par WBC 6.53 hemoglobin 13.0 hematocrit 41.8\par platelet count 266,000\par homocystine normal range at 13.6\par PTT 57.5\par cardiology pharmacologic nuclear stress test\par November 5, 2021 Frank Gu MD\par negative EKG during pharmacologic stress test\par heart rate response of appropriate blood pressure\par myocardial perfusion SPECT results normal\par baseline EKG of November 5, 2021\par normal sinus rhythm\par incomplete right bundle branch block colonoscopy 2015\par diverticulosis\par upper endoscopy 2015\par 3 cm hiatal hernia\par suspected Kovacs's esophagitis\par \par Noted older data reviewed dating back to September 30, 2016 hemoglobin A1c 6.6 September 30, 2016 CT brain stroke protocol\par no acute intracranial pathology\par MRI brain September 30, 2016\par chief complaint at the time headache numbness\par negative evidence for acute infarct\par mild chronic white matter changes\par brain MRA\par no evidence for acute infarct or hemorrhage\par negative MRA study

## 2021-11-10 NOTE — DISCUSSION/SUMMARY
[FreeTextEntry1] : Well visit\par PREDM\par Osteoporosis follow-up bone density November 2023- request to hold oral tx\par History of TIA\par Hyperlipidemia\par Hypertension\par History of paroxysmal atrial fibrillation\par Fall osteoarthritis\par History of reflux and active\par Abnormal urine-check urine culture\par Check status regarding pneumococcal vaccine and shingles vaccine\par Office follow-up 3 months

## 2021-11-10 NOTE — PHYSICAL EXAM
[No Acute Distress] : no acute distress [Normal Oropharynx] : normal oropharynx [I] : Mallampati Class: I [Normal Appearance] : normal appearance [Supple] : supple [No Neck Mass] : no neck mass [No JVD] : no jvd [Normal Rate/Rhythm] : normal rate/rhythm [Normal S1, S2] : normal s1, s2 [No Murmurs] : no murmurs [No Rubs] : no rubs [No Gallops] : no gallops [No Resp Distress] : no resp distress [No Acc Muscle Use] : no acc muscle use [Normal Palpation] : normal palpation [Normal Rhythm and Effort] : normal rhythm and effort [Clear to Auscultation Bilaterally] : clear to auscultation bilaterally [No Abnormalities] : no abnormalities [Benign] : benign [Not Tender] : not tender [Soft] : soft [No HSM] : no hsm [Normal Bowel Sounds] : normal bowel sounds [Normal Gait] : normal gait [No Clubbing] : no clubbing [No Cyanosis] : no cyanosis [No Edema] : no edema [FROM] : FROM [Normal Color/ Pigmentation] : normal color/ pigmentation [No Focal Deficits] : no focal deficits [No Sensory Deficits] : no sensory deficits [No Motor Deficits] : no motor deficits [Oriented x3] : oriented x3 [Normal Mood] : normal mood [Normal to Percussion] : normal to percussion [TextBox_105] : Lower extremity nontender varicose veins

## 2021-11-10 NOTE — REVIEW OF SYSTEMS
[Negative] : Hematologic [Abdominal Pain] : no abdominal pain [Nausea] : no nausea [Constipation] : no constipation [Dysphagia] : no dysphagia [Food Intolerance] : no food intolerance [Hepatic Disease] : no hepatic disease [Depression] : no depression [Anxiety] : no anxiety [Diabetes] : no diabetes [Thyroid Problem] : no thyroid problem [TextBox_44] : as noted [TextBox_69] : HH , dicerticulosis [TextBox_94] : OA [TextBox_122] : TIA 2016

## 2021-11-12 LAB — BACTERIA UR CULT: NORMAL

## 2022-02-17 ENCOUNTER — APPOINTMENT (OUTPATIENT)
Dept: PULMONOLOGY | Facility: CLINIC | Age: 82
End: 2022-02-17
Payer: MEDICARE

## 2022-02-17 VITALS
SYSTOLIC BLOOD PRESSURE: 132 MMHG | OXYGEN SATURATION: 95 % | DIASTOLIC BLOOD PRESSURE: 82 MMHG | HEART RATE: 86 BPM | TEMPERATURE: 97.2 F

## 2022-02-17 DIAGNOSIS — G47.30 SLEEP APNEA, UNSPECIFIED: ICD-10-CM

## 2022-02-17 LAB
GLUCOSE BLDC GLUCOMTR-MCNC: 111
HBA1C MFR BLD HPLC: 6.3

## 2022-02-17 PROCEDURE — 83036 HEMOGLOBIN GLYCOSYLATED A1C: CPT | Mod: QW

## 2022-02-17 PROCEDURE — 90670 PCV13 VACCINE IM: CPT

## 2022-02-17 PROCEDURE — 82962 GLUCOSE BLOOD TEST: CPT

## 2022-02-17 PROCEDURE — G0009: CPT

## 2022-02-17 PROCEDURE — 99214 OFFICE O/P EST MOD 30 MIN: CPT | Mod: 25

## 2022-02-17 NOTE — DISCUSSION/SUMMARY
[FreeTextEntry1] : Well visit Nov 2021\par chronic  fatigue daytime hypersomnolence -snoring -R/O AVA- HSS\par PREDM\par Osteoporosis follow-up bone density November 2023- request to hold oral tx\par History of TIA- coumadin\par Hyperlipidemia\par Hypertension\par History of paroxysmal atrial fibrillation\par Fall osteoarthritis\par History of reflux and active\par Abnormal urine-check urine culture\par Check status regarding pneumococcal vaccine and shingles vaccine\par noted per  cardiology [facility] TRI\par Office follow-up 3 months

## 2022-02-17 NOTE — HISTORY OF PRESENT ILLNESS
[Never] : never [Daytime Somnolence] : daytime somnolence [TextBox_4] : 81-year-old\par Well visit\par Data reviewed procedure as noted\par Feeling well\par No active complaints\par Does have some fatigue\par Occasional arthritic pain\par Able to perform normal daily activities of living\par No depression\par Eating well\par Weight stable appetite normal\par GI up-to-date\par Cardiology up-to-date reviewed data noted\par Past medical history TIA two thousand sixteen on Coumadin\par History of reflux not active\par History history hypertension\par History of LVH\par History of osteoarthritis\par History of paroxysmal atrial fibrillation on\par Hyperlipidemia on statin therapy\par Varicose veins asymptomatic\par \par States Covid vaccine protocol to dose Pfizer up-to-date\par Received flu shotfall 2021\par DTaP 5 years ago\par Question Shingrix\par Question pneumonia vaccine

## 2022-02-17 NOTE — PROCEDURE
[FreeTextEntry1] : POCT 2/27/22\par HGBA1C 6.3\par Glucose 111\par \par POCT 11/10/21\par HGBA1C 6.2\par GLUcose 122\par \par DEXA scan November 10, 2020- f/u Nov 2022\par Left forearm osteoporosis\par Lumbar spine normal\par \par Chest x-ray PA lateral\par November 10, 2021\par Normal cardiac size\par Uncoiled aorta\par Levoscoliosis\par Clear lungs without parenchymal infiltrates pleural effusions or dominant pulmonary nodules\par \par Data review \par Last chest x-ray reviewed dating back to September 14, 2019\par indication was cough at that time\par impression clear lungs levoscoliosis\par degenerative changes of the shoulders and spine\par \par management was Frank Gaffney MD cardiology\par November 5 2021\par PT/INR 3.57- coumadin\par TSH 2.12\par CRP less than 3 normal range\par serum electrolytes\par serum sodium 145 potassium 5.1 serum chloride 107\par serum bicarb 23\par glucose 97\par BUN 21 creatinine 0.91\par serum calcium 10.0\par total protein 6.7 liver function testing normal\par alkaline phosphatase 60\par total bilirubin normal 0.5\par lipid profile\par direct LDL 80\par total cholesterol 165\par HDL 61\par triglycerides 118\par LDL 80\par CBC\par WBC 6.53 hemoglobin 13.0 hematocrit 41.8\par platelet count 266,000\par homocystine normal range at 13.6\par PTT 57.5\par cardiology pharmacologic nuclear stress test\par November 5, 2021 Frank Gu MD\par negative EKG during pharmacologic stress test\par heart rate response of appropriate blood pressure\par myocardial perfusion SPECT results normal\par baseline EKG of November 5, 2021\par normal sinus rhythm\par incomplete right bundle branch block colonoscopy 2015\par diverticulosis\par upper endoscopy 2015\par 3 cm hiatal hernia\par suspected Kovacs's esophagitis\par \par Noted older data reviewed dating back to September 30, 2016 hemoglobin A1c 6.6 September 30, 2016 CT brain stroke protocol\par no acute intracranial pathology\par MRI brain September 30, 2016\par chief complaint at the time headache numbness\par negative evidence for acute infarct\par mild chronic white matter changes\par brain MRA\par no evidence for acute infarct or hemorrhage\par negative MRA study

## 2022-02-17 NOTE — PHYSICAL EXAM
[No Acute Distress] : no acute distress [Normal Oropharynx] : normal oropharynx [I] : Mallampati Class: I [Normal Appearance] : normal appearance [Supple] : supple [No Neck Mass] : no neck mass [No JVD] : no jvd [Normal Rate/Rhythm] : normal rate/rhythm [Normal S1, S2] : normal s1, s2 [No Murmurs] : no murmurs [No Rubs] : no rubs [No Gallops] : no gallops [No Resp Distress] : no resp distress [No Acc Muscle Use] : no acc muscle use [Normal Palpation] : normal palpation [Normal Rhythm and Effort] : normal rhythm and effort [Clear to Auscultation Bilaterally] : clear to auscultation bilaterally [Normal to Percussion] : normal to percussion [No Abnormalities] : no abnormalities [Benign] : benign [Not Tender] : not tender [Soft] : soft [No HSM] : no hsm [Normal Bowel Sounds] : normal bowel sounds [Normal Gait] : normal gait [No Clubbing] : no clubbing [No Cyanosis] : no cyanosis [No Edema] : no edema [FROM] : FROM [Normal Color/ Pigmentation] : normal color/ pigmentation [No Focal Deficits] : no focal deficits [No Sensory Deficits] : no sensory deficits [No Motor Deficits] : no motor deficits [Oriented x3] : oriented x3 [Normal Mood] : normal mood [TextBox_105] : Lower extremity nontender varicose veins

## 2022-02-23 DIAGNOSIS — G47.10 HYPERSOMNIA, UNSPECIFIED: ICD-10-CM

## 2022-02-25 ENCOUNTER — RX RENEWAL (OUTPATIENT)
Age: 82
End: 2022-02-25

## 2022-03-09 ENCOUNTER — APPOINTMENT (OUTPATIENT)
Dept: PULMONOLOGY | Facility: CLINIC | Age: 82
End: 2022-03-09
Payer: MEDICARE

## 2022-03-09 PROCEDURE — 95800 SLP STDY UNATTENDED: CPT

## 2022-03-10 PROCEDURE — 95800 SLP STDY UNATTENDED: CPT

## 2022-03-22 ENCOUNTER — APPOINTMENT (OUTPATIENT)
Dept: PULMONOLOGY | Facility: CLINIC | Age: 82
End: 2022-03-22
Payer: MEDICARE

## 2022-03-22 VITALS — SYSTOLIC BLOOD PRESSURE: 130 MMHG | OXYGEN SATURATION: 96 % | DIASTOLIC BLOOD PRESSURE: 79 MMHG | HEART RATE: 89 BPM

## 2022-03-22 PROCEDURE — 99213 OFFICE O/P EST LOW 20 MIN: CPT

## 2022-03-22 NOTE — PROCEDURE
[FreeTextEntry1] : POCT 2/27/22\par HGBA1C 6.3\par Glucose 111\par \par POCT 11/10/21\par HGBA1C 6.2\par GLUcose 122\par \par Sleep study\par March 9 March 10, 2022\par Mild obstructive sleep apnea\par AHI 12\par Percent time less than 90% room air 0.9%\par \par Address treatment protocol \par \par DEXA scan November 10, 2020- f/u Nov 2022\par Left forearm osteoporosis\par Lumbar spine normal\par \par Chest x-ray PA lateral\par November 10, 2021\par Normal cardiac size\par Uncoiled aorta\par Levoscoliosis\par Clear lungs without parenchymal infiltrates pleural effusions or dominant pulmonary nodules\par \par Data review \par Last chest x-ray reviewed dating back to September 14, 2019\par indication was cough at that time\par impression clear lungs levoscoliosis\par degenerative changes of the shoulders and spine\par \par management was Frank Gaffney MD cardiology\par November 5 2021\par PT/INR 3.57- coumadin\par TSH 2.12\par CRP less than 3 normal range\par serum electrolytes\par serum sodium 145 potassium 5.1 serum chloride 107\par serum bicarb 23\par glucose 97\par BUN 21 creatinine 0.91\par serum calcium 10.0\par total protein 6.7 liver function testing normal\par alkaline phosphatase 60\par total bilirubin normal 0.5\par lipid profile\par direct LDL 80\par total cholesterol 165\par HDL 61\par triglycerides 118\par LDL 80\par CBC\par WBC 6.53 hemoglobin 13.0 hematocrit 41.8\par platelet count 266,000\par homocystine normal range at 13.6\par PTT 57.5\par cardiology pharmacologic nuclear stress test\par November 5, 2021 Frank Gu MD\par negative EKG during pharmacologic stress test\par heart rate response of appropriate blood pressure\par myocardial perfusion SPECT results normal\par baseline EKG of November 5, 2021\par normal sinus rhythm\par incomplete right bundle branch block colonoscopy 2015\par diverticulosis\par upper endoscopy 2015\par 3 cm hiatal hernia\par suspected Kovacs's esophagitis\par \par Noted older data reviewed dating back to September 30, 2016 hemoglobin A1c 6.6 September 30, 2016 CT brain stroke protocol\par no acute intracranial pathology\par MRI brain September 30, 2016\par chief complaint at the time headache numbness\par negative evidence for acute infarct\par mild chronic white matter changes\par brain MRA\par no evidence for acute infarct or hemorrhage\par negative MRA study

## 2022-03-22 NOTE — DISCUSSION/SUMMARY
[FreeTextEntry1] : Well visit Nov 2021\par chronic  fatigue daytime hypersomnolence \par AVA mild AHI 12\par PREDM\par Osteoporosis follow-up bone density November 2023- request to hold oral tx\par History of TIA- coumadin\par Hyperlipidemia\par Hypertension\par History of paroxysmal atrial fibrillation\par osteoarthritis\par History of reflux and active\par Abnormal urine-check urine culture\par Check status regarding pneumococcal vaccine and shingles vaccine\par noted per  cardiology [facility] TRI\par Office follow-up 1 months\par Recommended AUTO CPAP 6-16 cm H20\par Loree risks clinical benefits of CPAP\par Did address other treatment options oral appliance but favor CPAP as first choice\par All questions answered\par Schedule appointment for CPAP initiation followed by a schedule in the office\par Data compliance adherence addressed with greater than 70% usage hours greater than 4\par

## 2022-03-22 NOTE — HISTORY OF PRESENT ILLNESS
[Never] : never [Obstructive Sleep Apnea] : obstructive sleep apnea [Daytime Somnolence] : daytime somnolence [TextBox_4] : 81-year-old\par f/u sleep study-AVA\par Well visit completed November 10, 2021\par Data reviewed procedure as noted\par Feeling well\par No active complaints\par Does have some fatigue\par Occasional arthritic pain\par Able to perform normal daily activities of living\par No depression\par Eating well\par Weight stable appetite normal\par GI up-to-date\par Cardiology up-to-date reviewed data noted\par Past medical history TIA two thousand sixteen on Coumadin\par History of reflux not active\par History history hypertension\par History of LVH\par History of osteoarthritis\par History of paroxysmal atrial fibrillation on\par Hyperlipidemia on statin therapy\par Varicose veins asymptomatic\par \par States Covid vaccine protocol to dose Pfizer up-to-date\par Received flu shotfall 2021\par DTaP 5 years ago\par Question Shingrix\par Question pneumonia vaccine [TextBox_100] : 3/9-10/2022 [TextBox_108] : 12 [TextBox_112] : 0.9 [TextBox_116] : [age]

## 2022-07-19 ENCOUNTER — APPOINTMENT (OUTPATIENT)
Dept: PULMONOLOGY | Facility: CLINIC | Age: 82
End: 2022-07-19

## 2022-07-26 ENCOUNTER — APPOINTMENT (OUTPATIENT)
Dept: PULMONOLOGY | Facility: CLINIC | Age: 82
End: 2022-07-26

## 2022-07-26 VITALS
BODY MASS INDEX: 23.32 KG/M2 | WEIGHT: 140 LBS | TEMPERATURE: 97.5 F | OXYGEN SATURATION: 95 % | HEART RATE: 108 BPM | DIASTOLIC BLOOD PRESSURE: 76 MMHG | HEIGHT: 65 IN | SYSTOLIC BLOOD PRESSURE: 127 MMHG

## 2022-07-26 DIAGNOSIS — R14.0 ABDOMINAL DISTENSION (GASEOUS): ICD-10-CM

## 2022-07-26 PROCEDURE — 99213 OFFICE O/P EST LOW 20 MIN: CPT

## 2022-07-26 RX ORDER — BENZONATATE 200 MG/1
200 CAPSULE ORAL
Qty: 21 | Refills: 0 | Status: DISCONTINUED | COMMUNITY
Start: 2022-06-21

## 2022-07-26 NOTE — DISCUSSION/SUMMARY
[FreeTextEntry1] : Post UTI sxs\par Well visit Nov 2021\par chronic  fatigue daytime hypersomnolence \par AVA mild AHI 12\par PREDM\par Osteoporosis follow-up bone density November 2023- request to hold oral tx\par History of TIA- coumadin\par Hyperlipidemia\par Hypertension\par History of paroxysmal atrial fibrillation\par osteoarthritis\par History of reflux and active\par Abnormal urine-check urine culture\par Check status regarding pneumococcal vaccine and shingles vaccine\par noted per  cardiology [facility] TRI\par Office follow-up 1 months\par Recommended AUTO CPAP 6-16 cm H20\par Loree risks clinical benefits of CPAP\par Did address other treatment options oral appliance but favor CPAP as first choice\par All questions answered\par Schedule appointment for CPAP initiation followed by a schedule in the office\par Data compliance adherence addressed with greater than 70% usage hours greater than 4\par RENAL \par post UTI \par US bladder renal\par  Urine panel with culture\par

## 2022-07-26 NOTE — HISTORY OF PRESENT ILLNESS
[Never] : never [Obstructive Sleep Apnea] : obstructive sleep apnea [Daytime Somnolence] : daytime somnolence [TextBox_4] : 81-year-old\par 10 days Urgent care [facility] pos UTI\par txed nitrofurantion 5days and completes past Friday\par had also received prridum x 2 days with orange urine\par still with funny urine sxs\par  not foul smelling\par pos  ECOLI reported on culture\par \par f/u sleep study-AVA\par Well visit completed November 10, 2021\par Data reviewed procedure as noted\par Feeling well\par No active complaints\par Does have some fatigue\par Occasional arthritic pain\par Able to perform normal daily activities of living\par No depression\par Eating well\par Weight stable appetite normal\par GI up-to-date\par Cardiology up-to-date reviewed data noted\par Past medical history TIA two thousand sixteen on Coumadin\par History of reflux not active\par History history hypertension\par History of LVH\par History of osteoarthritis\par History of paroxysmal atrial fibrillation on\par Hyperlipidemia on statin therapy\par Varicose veins asymptomatic\par \par States Covid vaccine protocol to dose Pfizer up-to-date\par Received flu shotfall 2021\par DTaP 5 years ago\par Question Shingrix\par Question pneumonia vaccine [TextBox_100] : 3/9-10/2022 [TextBox_108] : 12 [TextBox_112] : 0.9 [TextBox_116] : 81.1

## 2022-07-27 ENCOUNTER — NON-APPOINTMENT (OUTPATIENT)
Age: 82
End: 2022-07-27

## 2022-07-27 LAB
APPEARANCE: CLEAR
BACTERIA: NEGATIVE
BILIRUBIN URINE: NEGATIVE
BLOOD URINE: NEGATIVE
COLOR: COLORLESS
CREAT SPEC-SCNC: 11 MG/DL
GLUCOSE QUALITATIVE U: NEGATIVE
HYALINE CASTS: 0 /LPF
KETONES URINE: NEGATIVE
LEUKOCYTE ESTERASE URINE: ABNORMAL
MICROALBUMIN 24H UR DL<=1MG/L-MCNC: <1.2 MG/DL
MICROALBUMIN/CREAT 24H UR-RTO: NORMAL MG/G
MICROSCOPIC-UA: NORMAL
NITRITE URINE: NEGATIVE
PH URINE: 6.5
PROTEIN URINE: NEGATIVE
RED BLOOD CELLS URINE: 1 /HPF
SPECIFIC GRAVITY URINE: 1
SQUAMOUS EPITHELIAL CELLS: 0 /HPF
UROBILINOGEN URINE: NORMAL
WHITE BLOOD CELLS URINE: 30 /HPF

## 2022-07-30 LAB — BACTERIA UR CULT: ABNORMAL

## 2022-08-15 ENCOUNTER — APPOINTMENT (OUTPATIENT)
Dept: UROLOGY | Facility: CLINIC | Age: 82
End: 2022-08-15

## 2022-08-15 DIAGNOSIS — N39.0 URINARY TRACT INFECTION, SITE NOT SPECIFIED: ICD-10-CM

## 2022-08-15 DIAGNOSIS — B96.20 URINARY TRACT INFECTION, SITE NOT SPECIFIED: ICD-10-CM

## 2022-08-15 DIAGNOSIS — N28.1 CYST OF KIDNEY, ACQUIRED: ICD-10-CM

## 2022-08-15 PROCEDURE — 99204 OFFICE O/P NEW MOD 45 MIN: CPT

## 2022-08-15 NOTE — PHYSICAL EXAM
[General Appearance - Well Developed] : well developed [General Appearance - Well Nourished] : well nourished [Normal Appearance] : normal appearance [Well Groomed] : well groomed [General Appearance - In No Acute Distress] : no acute distress [Edema] : no peripheral edema [Respiration, Rhythm And Depth] : normal respiratory rhythm and effort [Exaggerated Use Of Accessory Muscles For Inspiration] : no accessory muscle use [Abdomen Soft] : soft [Abdomen Tenderness] : non-tender [Abdomen Mass (___ Cm)] : no abdominal mass palpated [Abdomen Hernia] : no hernia was discovered [Costovertebral Angle Tenderness] : no ~M costovertebral angle tenderness [Urethral Meatus] : normal urethra [External Female Genitalia] : normal external genitalia [Vagina] : normal vaginal exam [FreeTextEntry1] : soft urethra, urethra and bladder not tender, stool felt vag in rectal vault, no prolapse or discharge noted [Normal Station and Gait] : the gait and station were normal for the patient's age [] : no rash [No Focal Deficits] : no focal deficits [Oriented To Time, Place, And Person] : oriented to person, place, and time [Affect] : the affect was normal [Mood] : the mood was normal [Not Anxious] : not anxious [No Palpable Adenopathy] : no palpable adenopathy

## 2022-08-15 NOTE — ASSESSMENT
[FreeTextEntry1] : patient with hx of  E coli uti July 2022 and confirmed one week later \par LEONA and bladder us done : no stones or hydro but large 5 cm right renal cyst found\par no LUTS a bother except for some INC for which patient uses pads \par went to pcp with sxs of SP pressure , admits to avg water and constipation as likley causes of uti \par denies frequent utis ( only 2 this year and one last year)\par denies any fever, n/V or hematuria \par Aug 2022; creat0.92\par here for further eval :\par 1- discussed LEONA findings of cyst - as not described as complex- no f/u needed\par 2- will repeat urne today \par 3- gyn exam signif for vag dryness\par 4-encourage fluids and better bowel regimen and frquent pad changes

## 2022-08-15 NOTE — HISTORY OF PRESENT ILLNESS
[FreeTextEntry1] : patient with hx of  E coli uti July 2022 and confirmed one week later \par LEONA and bladder us done : no stones or hydro but large 5 cm right renal cyst found\par no LUTS a bother except for some INC for which patient uses pads \par went to pcp with sxs of SP pressure , admits to avg water and constipation as likley causes of uti \par denies frequent utis ( only 2 this year and one last year)\par denies any fever, n/V or hematuria \par Aug 2022; creat0.92\par here for further eval :

## 2022-08-19 LAB
APPEARANCE: CLEAR
BACTERIA UR CULT: NORMAL
BACTERIA: NEGATIVE
BILIRUBIN URINE: NEGATIVE
BLOOD URINE: NEGATIVE
COLOR: YELLOW
GLUCOSE QUALITATIVE U: NEGATIVE
HYALINE CASTS: 3 /LPF
KETONES URINE: NEGATIVE
LEUKOCYTE ESTERASE URINE: ABNORMAL
MICROSCOPIC-UA: NORMAL
NITRITE URINE: NEGATIVE
PH URINE: 5.5
PROTEIN URINE: NORMAL
RED BLOOD CELLS URINE: 6 /HPF
SPECIFIC GRAVITY URINE: 1.03
SQUAMOUS EPITHELIAL CELLS: 2 /HPF
UROBILINOGEN URINE: NORMAL
WHITE BLOOD CELLS URINE: 38 /HPF

## 2022-08-24 ENCOUNTER — APPOINTMENT (OUTPATIENT)
Dept: UROLOGY | Facility: CLINIC | Age: 82
End: 2022-08-24

## 2022-08-24 VITALS
SYSTOLIC BLOOD PRESSURE: 130 MMHG | DIASTOLIC BLOOD PRESSURE: 81 MMHG | OXYGEN SATURATION: 94 % | RESPIRATION RATE: 16 BRPM | HEART RATE: 98 BPM | WEIGHT: 140 LBS | HEIGHT: 65 IN | BODY MASS INDEX: 23.32 KG/M2

## 2022-08-24 DIAGNOSIS — R82.90 UNSPECIFIED ABNORMAL FINDINGS IN URINE: ICD-10-CM

## 2022-08-24 PROCEDURE — 51701 INSERT BLADDER CATHETER: CPT

## 2022-08-26 LAB
APPEARANCE: CLEAR
BACTERIA: NEGATIVE
BILIRUBIN URINE: NEGATIVE
BLOOD URINE: NEGATIVE
COLOR: YELLOW
GLUCOSE QUALITATIVE U: NEGATIVE
HYALINE CASTS: 0 /LPF
KETONES URINE: NEGATIVE
LEUKOCYTE ESTERASE URINE: NEGATIVE
MICROSCOPIC-UA: NORMAL
NITRITE URINE: NEGATIVE
PH URINE: 6.5
PROTEIN URINE: NORMAL
RED BLOOD CELLS URINE: 3 /HPF
SPECIFIC GRAVITY URINE: 1.02
SQUAMOUS EPITHELIAL CELLS: 1 /HPF
UROBILINOGEN URINE: NORMAL
WHITE BLOOD CELLS URINE: 1 /HPF

## 2022-11-08 ENCOUNTER — APPOINTMENT (OUTPATIENT)
Dept: PULMONOLOGY | Facility: CLINIC | Age: 82
End: 2022-11-08

## 2022-11-08 VITALS
DIASTOLIC BLOOD PRESSURE: 78 MMHG | BODY MASS INDEX: 23.32 KG/M2 | TEMPERATURE: 96.7 F | OXYGEN SATURATION: 98 % | HEIGHT: 65 IN | HEART RATE: 80 BPM | WEIGHT: 140 LBS | SYSTOLIC BLOOD PRESSURE: 135 MMHG

## 2022-11-08 DIAGNOSIS — U07.1 COVID-19: ICD-10-CM

## 2022-11-08 PROCEDURE — 36415 COLL VENOUS BLD VENIPUNCTURE: CPT

## 2022-11-08 PROCEDURE — 99214 OFFICE O/P EST MOD 30 MIN: CPT | Mod: 25

## 2022-11-08 PROCEDURE — 71046 X-RAY EXAM CHEST 2 VIEWS: CPT

## 2022-11-08 RX ORDER — APIXABAN 5 MG/1
5 TABLET, FILM COATED ORAL
Qty: 90 | Refills: 0 | Status: ACTIVE | COMMUNITY
Start: 2022-08-25

## 2022-11-08 RX ORDER — COVID-19 MOLECULAR TEST ASSAY
KIT MISCELLANEOUS
Qty: 8 | Refills: 0 | Status: DISCONTINUED | COMMUNITY
Start: 2022-06-26

## 2022-11-08 RX ORDER — WARFARIN 2 MG/1
2 TABLET ORAL
Qty: 30 | Refills: 0 | Status: DISCONTINUED | COMMUNITY
Start: 2022-08-06

## 2022-11-08 RX ORDER — NITROFURANTOIN (MONOHYDRATE/MACROCRYSTALS) 25; 75 MG/1; MG/1
100 CAPSULE ORAL
Qty: 10 | Refills: 0 | Status: DISCONTINUED | COMMUNITY
Start: 2022-07-17

## 2022-11-08 RX ORDER — PHENAZOPYRIDINE HYDROCHLORIDE 200 MG/1
200 TABLET ORAL
Qty: 6 | Refills: 0 | Status: DISCONTINUED | COMMUNITY
Start: 2022-07-14

## 2022-11-08 RX ORDER — WARFARIN 3 MG/1
3 TABLET ORAL
Qty: 90 | Refills: 0 | Status: DISCONTINUED | COMMUNITY
Start: 2022-06-14

## 2022-11-08 RX ORDER — WARFARIN SODIUM 6 MG/1
TABLET ORAL
Refills: 0 | Status: DISCONTINUED | COMMUNITY
End: 2022-11-08

## 2022-11-08 RX ORDER — FENOFIBRIC ACID 135 MG/1
135 CAPSULE, DELAYED RELEASE ORAL
Qty: 90 | Refills: 0 | Status: ACTIVE | COMMUNITY
Start: 2022-10-10

## 2022-11-08 RX ORDER — CIPROFLOXACIN HYDROCHLORIDE 500 MG/1
500 TABLET, FILM COATED ORAL
Qty: 14 | Refills: 0 | Status: DISCONTINUED | COMMUNITY
Start: 2022-07-27 | End: 2022-11-08

## 2022-11-08 RX ORDER — AMOXICILLIN 875 MG/1
875 TABLET, FILM COATED ORAL
Qty: 20 | Refills: 0 | Status: DISCONTINUED | COMMUNITY
Start: 2022-06-21

## 2022-11-08 RX ORDER — NIACIN 500 MG/1
500 TABLET, EXTENDED RELEASE ORAL
Qty: 90 | Refills: 0 | Status: DISCONTINUED | COMMUNITY
Start: 2022-05-16

## 2022-11-08 NOTE — DISCUSSION/SUMMARY
[FreeTextEntry1] : Post UTI sxs  noted\par Well visit Nov 2021\par chronic  fatigue daytime hypersomnolence \par AVA mild AHI 12\par PREDM\par Osteoporosis follow-up bone density November 2023- request to hold oral tx\par History of TIA- coumadin\par Hyperlipidemia\par Hypertension\par History of paroxysmal atrial fibrillation\par osteoarthritis\par History of reflux and active\par Abnormal urine-check urine culture\par Check status regarding pneumococcal vaccine and shingles vaccine\par noted per  cardiology [facility] TRI\par Office follow-up 1 months\par Recommended AUTO CPAP 6-16 cm H20\par Loree risks clinical benefits of CPAP\par Did address other treatment options oral appliance but favor CPAP as first choice\par All questions answered\par Schedule appointment for CPAP initiation followed by a schedule in the office\par Data compliance adherence addressed with greater than 70% usage hours greater than 4\par Noted patient never received a CPAP device will investigate and reorder\par

## 2022-11-08 NOTE — HISTORY OF PRESENT ILLNESS
[Never] : never [Obstructive Sleep Apnea] : obstructive sleep apnea [Daytime Somnolence] : daytime somnolence [TextBox_4] : 81-year-old\par noted change with Dr Gaffney change to Eliquis and Off Coumadin\par noted up  tp  date labs EKG with Dr Gaffney\par 10 days Urgent care [facility] pos UTI\par txed nitrofurantion 5days and completes past Friday\par had also received prridum x 2 days with orange urine\par still with funny urine sxs\par  not foul smelling\par pos  ECOLI reported on culture\par \par f/u sleep study-AAV\par Well visit completed November 10, 2021\par Data reviewed procedure as noted\par Feeling well\par No active complaints\par Does have some fatigue\par Occasional arthritic pain\par Able to perform normal daily activities of living\par No depression\par Eating well\par Weight stable appetite normal\par GI up-to-date\par Cardiology up-to-date reviewed data noted\par Past medical history TIA two thousand sixteen on Coumadin\par History of reflux not active\par History history hypertension\par History of LVH\par History of osteoarthritis\par History of paroxysmal atrial fibrillation on\par Hyperlipidemia on statin therapy\par Varicose veins asymptomatic\par \par States Covid vaccine protocol to dose Pfizer up-to-date\par Received flu shotfall 2021\par DTaP 5 years ago\par Question Shingrix\par Question pneumonia vaccine [TextBox_100] : 3/9-10/2022 [TextBox_108] : 12 [TextBox_112] : 0.9 [TextBox_116] : 81.1

## 2022-11-08 NOTE — PROCEDURE
[FreeTextEntry1] : Chest x-ray PA lateral 11/8/2022\par Cardiac size normal\par Aorta uncoiled\par Calcification of the trachea and mainstem bronchi\par Lung fields otherwise clear without parenchymal infiltrates pleural effusions or dominant pulmonary nodules\par Levoscoliosis\par \par POCT 2/27/22\par HGBA1C 6.3\par Glucose 111\par \par POCT 11/10/21\par HGBA1C 6.2\par GLUcose 122\par \par Sleep study\par March 9 March 10, 2022\par Mild obstructive sleep apnea\par AHI 12\par Percent time less than 90% room air 0.9%\par \par Address treatment protocol \par \par DEXA scan November 10, 2020- f/u Nov 2022\par Left forearm osteoporosis\par Lumbar spine normal\par \par Chest x-ray PA lateral\par November 10, 2021\par Normal cardiac size\par Uncoiled aorta\par Levoscoliosis\par Clear lungs without parenchymal infiltrates pleural effusions or dominant pulmonary nodules\par \par Data review \par Last chest x-ray reviewed dating back to September 14, 2019\par indication was cough at that time\par impression clear lungs levoscoliosis\par degenerative changes of the shoulders and spine\par \par management was Frank Gaffney MD cardiology\par November 5 2021\par PT/INR 3.57- coumadin\par TSH 2.12\par CRP less than 3 normal range\par serum electrolytes\par serum sodium 145 potassium 5.1 serum chloride 107\par serum bicarb 23\par glucose 97\par BUN 21 creatinine 0.91\par serum calcium 10.0\par total protein 6.7 liver function testing normal\par alkaline phosphatase 60\par total bilirubin normal 0.5\par lipid profile\par direct LDL 80\par total cholesterol 165\par HDL 61\par triglycerides 118\par LDL 80\par CBC\par WBC 6.53 hemoglobin 13.0 hematocrit 41.8\par platelet count 266,000\par homocystine normal range at 13.6\par PTT 57.5\par cardiology pharmacologic nuclear stress test\par November 5, 2021 Frank Gu MD\par negative EKG during pharmacologic stress test\par heart rate response of appropriate blood pressure\par myocardial perfusion SPECT results normal\par baseline EKG of November 5, 2021\par normal sinus rhythm\par incomplete right bundle branch block colonoscopy 2015\par diverticulosis\par upper endoscopy 2015\par 3 cm hiatal hernia\par suspected Kovacs's esophagitis\par \par Noted older data reviewed dating back to September 30, 2016 hemoglobin A1c 6.6 September 30, 2016 CT brain stroke protocol\par no acute intracranial pathology\par MRI brain September 30, 2016\par chief complaint at the time headache numbness\par negative evidence for acute infarct\par mild chronic white matter changes\par brain MRA\par no evidence for acute infarct or hemorrhage\par negative MRA study

## 2022-11-09 ENCOUNTER — NON-APPOINTMENT (OUTPATIENT)
Age: 82
End: 2022-11-09

## 2022-11-09 LAB
ANION GAP SERPL CALC-SCNC: 9 MMOL/L
BUN SERPL-MCNC: 15 MG/DL
CALCIUM SERPL-MCNC: 9.9 MG/DL
CHLORIDE SERPL-SCNC: 106 MMOL/L
CO2 SERPL-SCNC: 27 MMOL/L
CREAT SERPL-MCNC: 0.88 MG/DL
EGFR: 65 ML/MIN/1.73M2
ESTIMATED AVERAGE GLUCOSE: 134 MG/DL
GLUCOSE SERPL-MCNC: 86 MG/DL
HBA1C MFR BLD HPLC: 6.3 %
POTASSIUM SERPL-SCNC: 4.8 MMOL/L
RAPID RVP RESULT: DETECTED
RSV RNA SPEC QL NAA+PROBE: DETECTED
SARS-COV-2 RNA PNL RESP NAA+PROBE: NOT DETECTED
SODIUM SERPL-SCNC: 141 MMOL/L

## 2023-02-07 ENCOUNTER — APPOINTMENT (OUTPATIENT)
Dept: PULMONOLOGY | Facility: CLINIC | Age: 83
End: 2023-02-07
Payer: MEDICARE

## 2023-02-07 VITALS — OXYGEN SATURATION: 97 % | HEART RATE: 90 BPM | SYSTOLIC BLOOD PRESSURE: 124 MMHG | DIASTOLIC BLOOD PRESSURE: 80 MMHG

## 2023-02-07 PROCEDURE — 99214 OFFICE O/P EST MOD 30 MIN: CPT | Mod: 25

## 2023-02-07 PROCEDURE — G0009: CPT

## 2023-02-07 PROCEDURE — 90732 PPSV23 VACC 2 YRS+ SUBQ/IM: CPT

## 2023-02-07 PROCEDURE — 94010 BREATHING CAPACITY TEST: CPT

## 2023-02-07 NOTE — HISTORY OF PRESENT ILLNESS
[Obstructive Sleep Apnea] : obstructive sleep apnea [Daytime Somnolence] : daytime somnolence [TextBox_4] : 81-year-old\par Noted not yet recived CPAP\par noted change with Dr Gaffney change to Eliquis and Off Coumadin\par noted up  tp  date labs EKG with Dr Gaffney\par \par f/u Nov 2022 RSV with resolved cough\par \par f/u sleep study-AVA\par Well visit completed November 10, 2021\par Data reviewed procedure as noted\par Feeling well\par No active complaints\par Does have some fatigue\par Occasional arthritic pain\par Able to perform normal daily activities of living\par No depression\par Eating well\par Weight stable appetite normal\par GI up-to-date\par Cardiology up-to-date reviewed data noted\par Past medical history TIA two thousand sixteen on Coumadin\par History of reflux not active\par History history hypertension\par History of LVH\par History of osteoarthritis\par History of paroxysmal atrial fibrillation on\par Hyperlipidemia on statin therapy\par Varicose veins asymptomatic\par \par States Covid vaccine protocol to dose Pfizer up-to-date\par Received flu shotfall 2021\par DTaP 5 years ago\par Question Shingrix\par Question pneumonia vaccine [TextBox_100] : 3/9-10/2022 [TextBox_108] : 12 [TextBox_112] : 0.9 [TextBox_116] : 81.1

## 2023-02-07 NOTE — PROCEDURE
[FreeTextEntry1] : Spirometry no bronchodilator February 7, 2023\par Limited study\par Rule out severe obstructive ventilatory impairment\par FEV1 FVC ratio 41\par \par Outside labs reviewed 12/14 - 12/15/2022\par TSH normal range\par CBC normal range\par CRP less than 3\par INR 1.37\par Serum electrolytes normal\par Random glucose 113\par Creatinine 0.88\par Liver enzymes normal range\par GFR 65\par Total cholesterol 184\par HDL 61 LDL 99\par Triglycerides 122\par Non- normal range\par Homocystine 11.1 normal range\par A1C 6.1\par \par \par Chest x-ray PA lateral 11/8/2022\par Cardiac size normal\par Aorta uncoiled\par Calcification of the trachea and mainstem bronchi\par Lung fields otherwise clear without parenchymal infiltrates pleural effusions or dominant pulmonary nodules\par Levoscoliosis\par \par POCT 2/27/22\par HGBA1C 6.3\par Glucose 111\par \par POCT 11/10/21\par HGBA1C 6.2\par GLUcose 122\par \par Sleep study\par March 9 March 10, 2022\par Mild obstructive sleep apnea\par AHI 12\par Percent time less than 90% room air 0.9%\par \par Chest x-ray PA lateral\par November 10, 2021\par Normal cardiac size\par Uncoiled aorta\par Levoscoliosis\par Clear lungs without parenchymal infiltrates pleural effusions or dominant pulmonary nodules\par \par Data review \par Last chest x-ray reviewed dating back to September 14, 2019\par indication was cough at that time\par impression clear lungs levoscoliosis\par degenerative changes of the shoulders and spine\par \par management was Frank Gaffney MD cardiology\par November 5 2021\par PT/INR 3.57- coumadin\par TSH 2.12\par CRP less than 3 normal range\par serum electrolytes\par serum sodium 145 potassium 5.1 serum chloride 107\par serum bicarb 23\par glucose 97\par BUN 21 creatinine 0.91\par serum calcium 10.0\par total protein 6.7 liver function testing normal\par alkaline phosphatase 60\par total bilirubin normal 0.5\par lipid profile\par direct LDL 80\par total cholesterol 165\par HDL 61\par triglycerides 118\par LDL 80\par CBC\par WBC 6.53 hemoglobin 13.0 hematocrit 41.8\par platelet count 266,000\par homocystine normal range at 13.6\par PTT 57.5\par cardiology pharmacologic nuclear stress test\par November 5, 2021 Frank Gu MD\par negative EKG during pharmacologic stress test\par heart rate response of appropriate blood pressure\par myocardial perfusion SPECT results normal\par baseline EKG of November 5, 2021\par normal sinus rhythm\par incomplete right bundle branch block colonoscopy 2015\par diverticulosis\par upper endoscopy 2015\par 3 cm hiatal hernia\par suspected Kovacs's esophagitis\par \par Noted older data reviewed dating back to September 30, 2016 hemoglobin A1c 6.6 September 30, 2016 CT brain stroke protocol\par no acute intracranial pathology\par MRI brain September 30, 2016\par chief complaint at the time headache numbness\par negative evidence for acute infarct\par mild chronic white matter changes\par brain MRA\par no evidence for acute infarct or hemorrhage\par negative MRA study\par \par PCV 23 February 7, 2023 administered

## 2023-02-07 NOTE — DISCUSSION/SUMMARY
[FreeTextEntry1] : Abnormal spirometry rule out obstructive ventilatory impairment-repeat study with PFT\par PreDM last Dec A1C improved 6.1\par chronic  fatigue daytime hypersomnolence \par AVA mild AHI 12\par PREDM\par Osteoporosis follow-up bone density November 2023- request to hold oral tx\par History of TIA- coumadin\par Hyperlipidemia\par Hypertension\par History of paroxysmal atrial fibrillation\par osteoarthritis\par History of reflux and active\par Abnormal urine-check urine culture\par Check status regarding pneumococcal vaccine and shingles vaccine\par noted per  cardiology Niacin for inc TRI\par Office follow-up 1 months\par Recommended AUTO CPAP 6-16 cm H20\par Loree risks clinical benefits of CPAP\par Did address other treatment options oral appliance but favor CPAP as first choice\par All questions answered\par Schedule appointment for CPAP initiation followed by a schedule in the office\par Data compliance adherence addressed with greater than 70% usage hours greater than 4\par Noted patient never received a CPAP device will investigate and reorder\par DEXA Nov 2023 Noted T DAP is up to date  received 6 yrs prior\par

## 2023-03-21 ENCOUNTER — APPOINTMENT (OUTPATIENT)
Dept: PULMONOLOGY | Facility: CLINIC | Age: 83
End: 2023-03-21
Payer: MEDICARE

## 2023-03-21 VITALS — SYSTOLIC BLOOD PRESSURE: 125 MMHG | DIASTOLIC BLOOD PRESSURE: 80 MMHG | OXYGEN SATURATION: 96 % | HEART RATE: 87 BPM

## 2023-03-21 DIAGNOSIS — B33.8 OTHER SPECIFIED VIRAL DISEASES: ICD-10-CM

## 2023-03-21 PROCEDURE — 94729 DIFFUSING CAPACITY: CPT

## 2023-03-21 PROCEDURE — 99214 OFFICE O/P EST MOD 30 MIN: CPT | Mod: 25

## 2023-03-21 PROCEDURE — ZZZZZ: CPT

## 2023-03-21 PROCEDURE — 94010 BREATHING CAPACITY TEST: CPT

## 2023-03-21 PROCEDURE — 94726 PLETHYSMOGRAPHY LUNG VOLUMES: CPT

## 2023-03-21 NOTE — HISTORY OF PRESENT ILLNESS
[Never] : never [Obstructive Sleep Apnea] : obstructive sleep apnea [Daytime Somnolence] : daytime somnolence [TextBox_4] : 81-year-old\par  received CPAP AUTO CPAP had  some difficulty with use\par noted change with Dr Gaffney change to Eliquis and Off Coumadin\par noted up  t0  date labs EKG with Dr Gaffney\par data reviewed\par EKG noted echocardiogram\par Estimated ejection fraction 64%\par Mild to moderate mitral regurgitation\par Mild to moderate tricuspid regurgitation\par Normal globally LV dysfunction\par No reported pulmonary hypertension\par Data review\par PT INR 1.50 TSH 1.99 normal range\par CRP negative\par Serum electrolytes normal\par Renal function normal\par Random glucose 106\par Creatinine 0.81\par CBC: Normal range\par WBC 6.02 hemoglobin 12.5 hematocrit 40.3\par Platelets 268,000  normal range\par LDL 81\par Total cholesterol 170\par Homocystine 12.7 normal range\par Hemoglobin A1c 6.1% prediabetes\par Management \par \par f/u Nov 2022 RSV with resolved cough\par \par f/u sleep study-AVA\par Well visit completed November 10, 2021\par Data reviewed procedure as noted\par Feeling well\par No active complaints\par Does have some fatigue\par Occasional arthritic pain\par Able to perform normal daily activities of living\par No depression\par Eating well\par Weight stable appetite normal\par GI up-to-date\par Cardiology up-to-date reviewed data noted\par Past medical history TIA two thousand sixteen on Coumadin\par History of reflux not active\par History history hypertension\par History of LVH\par History of osteoarthritis\par History of paroxysmal atrial fibrillation on\par Hyperlipidemia on statin therapy\par Varicose veins asymptomatic\par \par States Covid vaccine protocol to dose Pfizer up-to-date\par Received flu shotfall 2021\par DTaP 5 years ago\par Question Shingrix\par Question pneumonia vaccine [TextBox_100] : 3/9-10/2022 [TextBox_108] : 12 [TextBox_112] : 0.9 [TextBox_116] : 82.1

## 2023-03-21 NOTE — DISCUSSION/SUMMARY
[FreeTextEntry1] : Abnormal spirometry rule out obstructive ventilatory impairment-repeat study with PFT-PFT demonstrates a mild restrictive ventilatory impairment with a very mild gas exchange impairment post RSV infection\par PreDM last Dec A1C improved 6.1\par chronic  fatigue daytime hypersomnolence \par AVA mild AHI 12\par PREDM\par Osteoporosis follow-up bone density November 2023- request to hold oral tx\par History of TIA- coumadin\par Hyperlipidemia\par Hypertension\par History of paroxysmal atrial fibrillation\par osteoarthritis\par History of reflux and active\par Abnormal urine-check urine culture\par Check status regarding pneumococcal vaccine and shingles vaccine\par noted per  cardiology Niacin for inc TRI\par Office follow-up 1 months\par poor tolerance AUTO CPAP 6-16 cm H20\par  risks clinical benefits of CPAP\par Did address other treatment options oral appliance but favor CPAP as first choice\par All questions answered\par Schedule appointment for CPAP initiation followed by a schedule in the office\par Data compliance adherence addressed with greater than 70% usage hours greater than 4\par \par DEXA Nov 2023 Noted T DAP is up to date  received 6 yrs prior\par CPAP initiation visit\par Plan follow-up to check CPAP data for obstructive sleep apnea

## 2023-03-21 NOTE — PROCEDURE
[FreeTextEntry1] : PFT body box March 21, 2023\par Post RSV infection\par Normal flow rates\par Ratio 75\par TLC 73% predicted\par Resistance and specific conductance above normal range\par Diffusion mildly reduced 60% predicted\par Overall impression mild restrictive ventilatory impairment with a very mild diffusion gas exchange impairment\par Hemoglobin 12.5\par Significant interval improvement at FEV1 compared to February 7, 2023\par \par Spirometry no bronchodilator February 7, 2023\par Limited study\par Rule out severe obstructive ventilatory impairment\par FEV1 FVC ratio 41\par \par \par Chest x-ray PA lateral 11/8/2022\par Cardiac size normal\par Aorta uncoiled\par Calcification of the trachea and mainstem bronchi\par Lung fields otherwise clear without parenchymal infiltrates pleural effusions or dominant pulmonary nodules\par Levoscoliosis\par \par POCT 2/27/22\par HGBA1C 6.3\par Glucose 111\par \par POCT 11/10/21\par HGBA1C 6.2\par GLUcose 122\par \par Sleep study\par March 9 March 10, 2022\par Mild obstructive sleep apnea\par AHI 12\par Percent time less than 90% room air 0.9%\par \par Chest x-ray PA lateral\par November 10, 2021\par Normal cardiac size\par Uncoiled aorta\par Levoscoliosis\par Clear lungs without parenchymal infiltrates pleural effusions or dominant pulmonary nodules\par \par Data review \par Last chest x-ray reviewed dating back to September 14, 2019\par indication was cough at that time\par impression clear lungs levoscoliosis\par degenerative changes of the shoulders and spine\par \par management was Frakn Gaffney MD cardiology\par November 5 2021\par PT/INR 3.57- coumadin\par TSH 2.12\par CRP less than 3 normal range\par serum electrolytes\par serum sodium 145 potassium 5.1 serum chloride 107\par serum bicarb 23\par glucose 97\par BUN 21 creatinine 0.91\par serum calcium 10.0\par total protein 6.7 liver function testing normal\par alkaline phosphatase 60\par total bilirubin normal 0.5\par lipid profile\par direct LDL 80\par total cholesterol 165\par HDL 61\par triglycerides 118\par LDL 80\par CBC\par WBC 6.53 hemoglobin 13.0 hematocrit 41.8\par platelet count 266,000\par homocystine normal range at 13.6\par PTT 57.5\par cardiology pharmacologic nuclear stress test\par November 5, 2021 Frank Gu MD\par negative EKG during pharmacologic stress test\par heart rate response of appropriate blood pressure\par myocardial perfusion SPECT results normal\par baseline EKG of November 5, 2021\par normal sinus rhythm\par incomplete right bundle branch block colonoscopy 2015\par diverticulosis\par upper endoscopy 2015\par 3 cm hiatal hernia\par suspected Kovacs's esophagitis\par \par Noted older data reviewed dating back to September 30, 2016 hemoglobin A1c 6.6 September 30, 2016 CT brain stroke protocol\par no acute intracranial pathology\par MRI brain September 30, 2016\par chief complaint at the time headache numbness\par negative evidence for acute infarct\par mild chronic white matter changes\par brain MRA\par no evidence for acute infarct or hemorrhage\par negative MRA study\par \par PCV 23 February 7, 2023 administered

## 2023-03-30 ENCOUNTER — APPOINTMENT (OUTPATIENT)
Dept: PULMONOLOGY | Facility: CLINIC | Age: 83
End: 2023-03-30
Payer: MEDICARE

## 2023-03-30 VITALS
WEIGHT: 150 LBS | OXYGEN SATURATION: 92 % | TEMPERATURE: 97.8 F | DIASTOLIC BLOOD PRESSURE: 78 MMHG | BODY MASS INDEX: 24.99 KG/M2 | HEIGHT: 65 IN | HEART RATE: 80 BPM | RESPIRATION RATE: 15 BRPM | SYSTOLIC BLOOD PRESSURE: 111 MMHG

## 2023-03-30 PROCEDURE — 94660 CPAP INITIATION&MGMT: CPT

## 2023-03-30 NOTE — PROCEDURE
[FreeTextEntry1] : Patient stable.  O2 Sat 92% on room air HR 80.  Patient has a ResMed Airsense 11 s/n [id number] D/N 725 with a Estevez FX mask size medium.  Minimum pressure 6cmH2O Maximum pressure [id number].  Patient given full demonstration on how to operate and maintain equipment and supplies.  Patient demonstrated self operation of equipment and supplies.

## 2023-04-07 ENCOUNTER — INPATIENT (INPATIENT)
Facility: HOSPITAL | Age: 83
LOS: 2 days | Discharge: HOME | End: 2023-04-10
Attending: INTERNAL MEDICINE | Admitting: INTERNAL MEDICINE
Payer: MEDICARE

## 2023-04-07 VITALS
HEART RATE: 85 BPM | OXYGEN SATURATION: 100 % | TEMPERATURE: 98 F | SYSTOLIC BLOOD PRESSURE: 159 MMHG | DIASTOLIC BLOOD PRESSURE: 94 MMHG | RESPIRATION RATE: 18 BRPM

## 2023-04-07 DIAGNOSIS — Z96.659 PRESENCE OF UNSPECIFIED ARTIFICIAL KNEE JOINT: Chronic | ICD-10-CM

## 2023-04-07 DIAGNOSIS — Z96.643 PRESENCE OF ARTIFICIAL HIP JOINT, BILATERAL: Chronic | ICD-10-CM

## 2023-04-07 PROCEDURE — 99291 CRITICAL CARE FIRST HOUR: CPT

## 2023-04-07 NOTE — STROKE CODE NOTE - IV ALTEPLASE ADMINISTRATION
No

## 2023-04-07 NOTE — ED ADULT TRIAGE NOTE - CHIEF COMPLAINT QUOTE
Pt reporting to the ED for L arm weakness and L facial "tingling" starting at ~ 10 pm. Reports intermittent dizziness and headaches. PMH of TIA, Afib.  MD Bloch called for stroke eval.

## 2023-04-07 NOTE — ED ADULT TRIAGE NOTE - PAIN: PRESENCE, MLM
SW spoke with pt who requested SW assistance in looking for financial resources to help with bills such utility bills and or mortgage. SW will contact JUDI and Timur Turner. SW provided pt with number and times for [employer] food . SW provided support to pt as she discussed recent surgery and difficulties with short term disability. SW will explore avaiable services for pt and follow up. Pt ok with plan.    Radha Giordano, JENNIEW     complains of pain/discomfort

## 2023-04-08 DIAGNOSIS — I63.9 CEREBRAL INFARCTION, UNSPECIFIED: ICD-10-CM

## 2023-04-08 DIAGNOSIS — I10 ESSENTIAL (PRIMARY) HYPERTENSION: ICD-10-CM

## 2023-04-08 DIAGNOSIS — I48.0 PAROXYSMAL ATRIAL FIBRILLATION: ICD-10-CM

## 2023-04-08 DIAGNOSIS — Z29.9 ENCOUNTER FOR PROPHYLACTIC MEASURES, UNSPECIFIED: ICD-10-CM

## 2023-04-08 DIAGNOSIS — R42 DIZZINESS AND GIDDINESS: ICD-10-CM

## 2023-04-08 DIAGNOSIS — R53.1 WEAKNESS: ICD-10-CM

## 2023-04-08 LAB
A1C WITH ESTIMATED AVERAGE GLUCOSE RESULT: 5.6 % — SIGNIFICANT CHANGE UP (ref 4–5.6)
ALBUMIN SERPL ELPH-MCNC: 4.5 G/DL — SIGNIFICANT CHANGE UP (ref 3.3–5)
ALP SERPL-CCNC: 80 U/L — SIGNIFICANT CHANGE UP (ref 40–120)
ALT FLD-CCNC: 23 U/L — SIGNIFICANT CHANGE UP (ref 4–33)
ANION GAP SERPL CALC-SCNC: 13 MMOL/L — SIGNIFICANT CHANGE UP (ref 7–14)
APTT BLD: 34.5 SEC — SIGNIFICANT CHANGE UP (ref 27–36.3)
AST SERPL-CCNC: 29 U/L — SIGNIFICANT CHANGE UP (ref 4–32)
BASOPHILS # BLD AUTO: 0.04 K/UL — SIGNIFICANT CHANGE UP (ref 0–0.2)
BASOPHILS NFR BLD AUTO: 0.6 % — SIGNIFICANT CHANGE UP (ref 0–2)
BILIRUB SERPL-MCNC: 0.3 MG/DL — SIGNIFICANT CHANGE UP (ref 0.2–1.2)
BUN SERPL-MCNC: 20 MG/DL — SIGNIFICANT CHANGE UP (ref 7–23)
CALCIUM SERPL-MCNC: 9.8 MG/DL — SIGNIFICANT CHANGE UP (ref 8.4–10.5)
CHLORIDE SERPL-SCNC: 105 MMOL/L — SIGNIFICANT CHANGE UP (ref 98–107)
CHOLEST SERPL-MCNC: 105 MG/DL — SIGNIFICANT CHANGE UP
CO2 SERPL-SCNC: 23 MMOL/L — SIGNIFICANT CHANGE UP (ref 22–31)
CREAT SERPL-MCNC: 0.91 MG/DL — SIGNIFICANT CHANGE UP (ref 0.5–1.3)
EGFR: 63 ML/MIN/1.73M2 — SIGNIFICANT CHANGE UP
EOSINOPHIL # BLD AUTO: 0.25 K/UL — SIGNIFICANT CHANGE UP (ref 0–0.5)
EOSINOPHIL NFR BLD AUTO: 3.9 % — SIGNIFICANT CHANGE UP (ref 0–6)
ESTIMATED AVERAGE GLUCOSE: 114 — SIGNIFICANT CHANGE UP
FLUAV AG NPH QL: SIGNIFICANT CHANGE UP
FLUBV AG NPH QL: SIGNIFICANT CHANGE UP
GLUCOSE SERPL-MCNC: 130 MG/DL — HIGH (ref 70–99)
HCT VFR BLD CALC: 39.1 % — SIGNIFICANT CHANGE UP (ref 34.5–45)
HDLC SERPL-MCNC: 40 MG/DL — LOW
HGB BLD-MCNC: 12.7 G/DL — SIGNIFICANT CHANGE UP (ref 11.5–15.5)
IANC: 3.3 K/UL — SIGNIFICANT CHANGE UP (ref 1.8–7.4)
IMM GRANULOCYTES NFR BLD AUTO: 0.3 % — SIGNIFICANT CHANGE UP (ref 0–0.9)
INR BLD: 1.51 RATIO — HIGH (ref 0.88–1.16)
LIPID PNL WITH DIRECT LDL SERPL: 54 MG/DL — SIGNIFICANT CHANGE UP
LYMPHOCYTES # BLD AUTO: 2.17 K/UL — SIGNIFICANT CHANGE UP (ref 1–3.3)
LYMPHOCYTES # BLD AUTO: 34.3 % — SIGNIFICANT CHANGE UP (ref 13–44)
MCHC RBC-ENTMCNC: 29.5 PG — SIGNIFICANT CHANGE UP (ref 27–34)
MCHC RBC-ENTMCNC: 32.5 GM/DL — SIGNIFICANT CHANGE UP (ref 32–36)
MCV RBC AUTO: 90.9 FL — SIGNIFICANT CHANGE UP (ref 80–100)
MONOCYTES # BLD AUTO: 0.55 K/UL — SIGNIFICANT CHANGE UP (ref 0–0.9)
MONOCYTES NFR BLD AUTO: 8.7 % — SIGNIFICANT CHANGE UP (ref 2–14)
NEUTROPHILS # BLD AUTO: 3.3 K/UL — SIGNIFICANT CHANGE UP (ref 1.8–7.4)
NEUTROPHILS NFR BLD AUTO: 52.2 % — SIGNIFICANT CHANGE UP (ref 43–77)
NON HDL CHOLESTEROL: 65 MG/DL — SIGNIFICANT CHANGE UP
NRBC # BLD: 0 /100 WBCS — SIGNIFICANT CHANGE UP (ref 0–0)
NRBC # FLD: 0 K/UL — SIGNIFICANT CHANGE UP (ref 0–0)
PLATELET # BLD AUTO: 246 K/UL — SIGNIFICANT CHANGE UP (ref 150–400)
POTASSIUM SERPL-MCNC: 4.2 MMOL/L — SIGNIFICANT CHANGE UP (ref 3.5–5.3)
POTASSIUM SERPL-SCNC: 4.2 MMOL/L — SIGNIFICANT CHANGE UP (ref 3.5–5.3)
PROT SERPL-MCNC: 6.7 G/DL — SIGNIFICANT CHANGE UP (ref 6–8.3)
PROTHROM AB SERPL-ACNC: 17.6 SEC — HIGH (ref 10.5–13.4)
RBC # BLD: 4.3 M/UL — SIGNIFICANT CHANGE UP (ref 3.8–5.2)
RBC # FLD: 12.9 % — SIGNIFICANT CHANGE UP (ref 10.3–14.5)
RSV RNA NPH QL NAA+NON-PROBE: SIGNIFICANT CHANGE UP
SARS-COV-2 RNA SPEC QL NAA+PROBE: SIGNIFICANT CHANGE UP
SODIUM SERPL-SCNC: 141 MMOL/L — SIGNIFICANT CHANGE UP (ref 135–145)
TRIGL SERPL-MCNC: 54 MG/DL — SIGNIFICANT CHANGE UP
TROPONIN T, HIGH SENSITIVITY RESULT: 12 NG/L — SIGNIFICANT CHANGE UP
TROPONIN T, HIGH SENSITIVITY RESULT: 13 NG/L — SIGNIFICANT CHANGE UP
TSH SERPL-MCNC: 1.46 UIU/ML — SIGNIFICANT CHANGE UP (ref 0.27–4.2)
WBC # BLD: 6.33 K/UL — SIGNIFICANT CHANGE UP (ref 3.8–10.5)
WBC # FLD AUTO: 6.33 K/UL — SIGNIFICANT CHANGE UP (ref 3.8–10.5)

## 2023-04-08 PROCEDURE — 70551 MRI BRAIN STEM W/O DYE: CPT | Mod: 26

## 2023-04-08 PROCEDURE — 99223 1ST HOSP IP/OBS HIGH 75: CPT

## 2023-04-08 PROCEDURE — 70496 CT ANGIOGRAPHY HEAD: CPT | Mod: 26,MA

## 2023-04-08 PROCEDURE — 99223 1ST HOSP IP/OBS HIGH 75: CPT | Mod: GC

## 2023-04-08 PROCEDURE — 93010 ELECTROCARDIOGRAM REPORT: CPT

## 2023-04-08 PROCEDURE — 70498 CT ANGIOGRAPHY NECK: CPT | Mod: 26,MA

## 2023-04-08 PROCEDURE — 0042T: CPT | Mod: MA

## 2023-04-08 PROCEDURE — 12345: CPT | Mod: NC

## 2023-04-08 RX ORDER — DEXLANSOPRAZOLE 30 MG/1
0 CAPSULE, DELAYED RELEASE ORAL
Qty: 0 | Refills: 5 | DISCHARGE

## 2023-04-08 RX ORDER — APIXABAN 2.5 MG/1
5 TABLET, FILM COATED ORAL EVERY 12 HOURS
Refills: 0 | Status: DISCONTINUED | OUTPATIENT
Start: 2023-04-08 | End: 2023-04-10

## 2023-04-08 RX ORDER — ASPIRIN/CALCIUM CARB/MAGNESIUM 324 MG
81 TABLET ORAL DAILY
Refills: 0 | Status: DISCONTINUED | OUTPATIENT
Start: 2023-04-08 | End: 2023-04-08

## 2023-04-08 RX ORDER — MECLIZINE HCL 12.5 MG
12.5 TABLET ORAL ONCE
Refills: 0 | Status: COMPLETED | OUTPATIENT
Start: 2023-04-08 | End: 2023-04-08

## 2023-04-08 RX ORDER — METOPROLOL TARTRATE 50 MG
0 TABLET ORAL
Qty: 0 | Refills: 0 | DISCHARGE

## 2023-04-08 RX ORDER — OLMESARTAN MEDOXOMIL 5 MG/1
0 TABLET, FILM COATED ORAL
Qty: 0 | Refills: 0 | DISCHARGE

## 2023-04-08 RX ORDER — PANTOPRAZOLE SODIUM 20 MG/1
40 TABLET, DELAYED RELEASE ORAL
Refills: 0 | Status: DISCONTINUED | OUTPATIENT
Start: 2023-04-08 | End: 2023-04-10

## 2023-04-08 RX ORDER — FENOFIBRIC ACID 105 MG/1
0 TABLET ORAL
Qty: 0 | Refills: 0 | DISCHARGE

## 2023-04-08 RX ORDER — ATORVASTATIN CALCIUM 80 MG/1
80 TABLET, FILM COATED ORAL AT BEDTIME
Refills: 0 | Status: DISCONTINUED | OUTPATIENT
Start: 2023-04-08 | End: 2023-04-10

## 2023-04-08 RX ORDER — MECLIZINE HCL 12.5 MG
25 TABLET ORAL EVERY 8 HOURS
Refills: 0 | Status: DISCONTINUED | OUTPATIENT
Start: 2023-04-08 | End: 2023-04-10

## 2023-04-08 RX ORDER — METOPROLOL TARTRATE 50 MG
12.5 TABLET ORAL EVERY 12 HOURS
Refills: 0 | Status: DISCONTINUED | OUTPATIENT
Start: 2023-04-08 | End: 2023-04-10

## 2023-04-08 RX ORDER — FOLIC ACID 0.8 MG
0 TABLET ORAL
Qty: 0 | Refills: 0 | DISCHARGE

## 2023-04-08 RX ORDER — ATORVASTATIN CALCIUM 80 MG/1
0 TABLET, FILM COATED ORAL
Qty: 0 | Refills: 0 | DISCHARGE

## 2023-04-08 RX ORDER — FOLIC ACID 0.8 MG
1 TABLET ORAL DAILY
Refills: 0 | Status: DISCONTINUED | OUTPATIENT
Start: 2023-04-08 | End: 2023-04-10

## 2023-04-08 RX ADMIN — Medication 12.5 MILLIGRAM(S): at 19:00

## 2023-04-08 RX ADMIN — PANTOPRAZOLE SODIUM 40 MILLIGRAM(S): 20 TABLET, DELAYED RELEASE ORAL at 13:04

## 2023-04-08 RX ADMIN — APIXABAN 5 MILLIGRAM(S): 2.5 TABLET, FILM COATED ORAL at 19:00

## 2023-04-08 RX ADMIN — Medication 12.5 MILLIGRAM(S): at 05:14

## 2023-04-08 RX ADMIN — Medication 1 MILLIGRAM(S): at 13:04

## 2023-04-08 RX ADMIN — Medication 81 MILLIGRAM(S): at 13:04

## 2023-04-08 RX ADMIN — ATORVASTATIN CALCIUM 80 MILLIGRAM(S): 80 TABLET, FILM COATED ORAL at 21:41

## 2023-04-08 NOTE — ED ADULT NURSE REASSESSMENT NOTE - NS ED NURSE REASSESS COMMENT FT1
Float RN: Pt A&Ox4, respirations equal and unlabored. Pt placed on cardiac monitor. NSR. Pt endorsing feeling of "brain fog" at this time. Denies dizziness or any other complaints. Safety maintained, bed in lowest position, side rails raised, call bell in reach. Handoff report given to primary RN Adilene.
Report given to [facility] RN, Dee. Respirations even and unlabored. No acute distress noted. AM labs sent as ordered. Safety maintained.
Pt appears comfortably resting in stretcher. Verbalizes improvement in dizziness after receiving meclizine. Respirations even and unlabored. No acute distress noted. NSR on bedside cardiac monitor. Denies CP, SOB, nausea, vomiting, headache, lightheadedness, dizziness, fever or chills. Bed in lowest position, call bell in reach, wheels locked, safety maintained. MD HERMINIA Barron currently at bedside assessing pt. Awaiting bed assignment as per admission orders.
Pt c/o dizziness and lightheadedness. MD Vela made aware and currently at bedside assessing pt. Denies CP, SOB, nausea, vomiting, fever or chills. Respirations even and unlabored. No acute distress noted, pt appears comfortably sitting up in stretcher, HOB elevated. Pt speaking in full and complete sentences. NSR on bedside cardiac monitor. 99% o2 saturation on bedside cardiac monitor.  at bedside. Bed in lowest position, call bell in reach, wheels locked, safety maintained. Awaiting CT results.
Break coverage RN. Patient resting in stretcher, A&Ox4, respirations even and unlabored, vitals stable, remains on cardiac monitor. Patient endorsing dizziness, medicated per orders. Neuro exam intact. Patient denies any other complaints. Aware of plan for admission to hospital. Stretcher in lowest position, wheels locked, appropriate side rails in place, call bell in reach.

## 2023-04-08 NOTE — H&P ADULT - TIME BILLING
Reviewed admission imaging reports, and admission labs prior to the encounter with  the patient   Reviewed Triage and ED course/ documentation   Reviewed consultants notes, including::: Neurology/ Stroke  Performed full physical exam and ROS  Obtained list of home medications and performed home medications reconciliation on EMR  Discussed prognosis, treatment further workup with the patient  Ordered or reviewed new admission medications and placed or reviewed all hospitalization admission orders  Managed (continued, held or adjusted doses) of home medications   Ordered  or reviewed orders of  new imaging and new lab w/up

## 2023-04-08 NOTE — ED PROVIDER NOTE - NSICDXPASTMEDICALHX_GEN_ALL_CORE_FT
PAST MEDICAL HISTORY:  HLD (hyperlipidemia)     HTN (hypertension)     TIA (transient ischemic attack)

## 2023-04-08 NOTE — H&P ADULT - PROBLEM SELECTOR PROBLEM 4
Encounter for deep vein thrombosis (DVT) prophylaxis HTN (hypertension) No bruits; no thyromegaly or nodules

## 2023-04-08 NOTE — CONSULT NOTE ADULT - ASSESSMENT
Patient Purvi Mclaughlin is a 83yo R handed woman PMHx HTN, HLD, TIA, afib on eliquis and asa who presents to ED with code stroke called. Accompanied by spouse. LKW with symptom onset 10PM 4/7/23. Developed dull headache of forehead (does report chronic history of headaches), new onset lightheadedness rather than dizziness when getting up to standing only. Reports also new onset LUE sleeve like heaviness, possible numbness. Otherwise no vision changes, double vision, slurred speech, word finding difficulty, chest pain, shortness of breath, gait instability (although she was leaning more towards L side).  On eliquis, last dose 10PM this night. CT head, CTA, CTP obtained given concern for stroke.     LKW: 10 PM 4/7  NIHSS: 2    Baseline MRS: 0   Not a TNKase candidate due to recent eliquis use  Not a thrombectomy candidate due to  no large vessel occlusion    CT head w/o con: no acute hemorrhage  CTA head/neck w/ con: patent circulation without severe stenosis or occlusion. + atherosclerotic disease.  CT perfusion: no core/penumbra    Impression: chronic headache with new lightheadedness but importantly LUE heaviness, exam with L facial droop and L sided leaning when ambulating concerning for R hemispheric dysfunction potentially ischemic stroke. Unusual if ischemic stroke given patient on aspirin and eliquis concurrently. Still has not resolved to baseline on re-evaluation.     Recommendations:  [ ] INCOMPLETE  [ ] No current neurovascular contraindication to continuing eliquis, aspirin for secondary stroke prevention. From stroke standpoint at this time, eliquis would suffice. Continuation of aspirin would be for other indications.    [ ] Atorvastatin 40-80 mg daily titrated per LDL < 70  [ ] HgbA1C, fasting lipid panel, CBC, CMP, coag panel, troponin  [ ]   [ ]    [ ] tele, EKG, cardiac w/u per primary team  [ ] thyroid nodule w/u per primary team    - Glucose control   - Stroke education and counseling  - Neuro-checks and VS q4h  - Permissive HTN up to 220/120 for 24-48h from symptom onset  - Dysphagia screen.   - aspiration, fall precautions     Discussed with stroke attending Dr Carlos Manuel Overton regarding decision against candidacy for TNKase/ thrombectomy and above recommendations/plan. Delay in note entry/ note updates is due to patient care.  Non-neurologic findings seen on imaging to be worked up per primary team if applicable. Discussed patient care with primary team, patient and in agreement. Recommendations will be finalized/amended once signed by attending.  Patient Purvi Mclaughlin is a 81yo R handed woman PMHx HTN, HLD, TIA, afib on eliquis and asa who presents to ED with code stroke called. Accompanied by spouse. LKW with symptom onset 10PM 4/7/23. Developed dull headache of forehead (does report chronic history of headaches), new onset lightheadedness rather than dizziness when getting up to standing only. Reports also new onset LUE sleeve like heaviness, possible numbness. Otherwise no vision changes, double vision, slurred speech, word finding difficulty, chest pain, shortness of breath, gait instability (although she was leaning more towards L side).  On eliquis, last dose 10PM this night. Of note, patient reports switching from warfarin to eliquis ~3 mo ago. Unclear specifically indication of warfarin and nature of her afib but she did not  want the frequent warfarin checks for which her cards Frank Gaffney switched her. CT head, CTA, CTP obtained given concern for stroke.     LKW: 10 PM 4/7  NIHSS: 2    Baseline MRS: 0   Not a TNKase candidate due to recent eliquis use  Not a thrombectomy candidate due to  no large vessel occlusion    CT head w/o con: no acute hemorrhage  CTA head/neck w/ con: patent circulation without severe stenosis or occlusion. + atherosclerotic disease.  CT perfusion: no core/penumbra    Impression: chronic headache with new lightheadedness but importantly LUE heaviness, exam with L facial droop and L sided leaning when ambulating concerning for R hemispheric dysfunction potentially ischemic stroke. Unusual if ischemic stroke given patient on aspirin and eliquis concurrently unless she is found with stroke and potentially warfarin is a better option. Still has not resolved to baseline on re-evaluation.     Recommendations:  [ ] No current neurovascular contraindication to continuing eliquis, aspirin for secondary stroke prevention. From stroke standpoint at this time, eliquis would suffice. Continuation of aspirin would be for other indications.    [ ] Atorvastatin 40-80 mg daily titrated per LDL < 70  [ ] HgbA1C, fasting lipid panel, CBC, CMP, coag panel, troponin  [ ] Patient still with symptoms and wants to potentially stay a bit longer. Can consider MRI in CDU, if not can recommend re-evaluation by neurology in AM for symptom improvement prior to determining if she needs to be formally admitted which would likely delay her MRI  [ ] If found with stroke on MRI if pursued, can obtain TTE. Otherwise can consider obtaining collateral from her cardiologist Dr Frank Gaffney.    [ ] tele, EKG, cardiac w/u per primary team  [ ] thyroid nodule w/u per primary team    - Glucose control   - Stroke education and counseling  - Neuro-checks and VS q4h  - Permissive HTN up to 220/120 for 24-48h from symptom onset  - Dysphagia screen.   - aspiration, fall precautions     Discussed with stroke attending Dr Carlos Manuel Overton regarding decision against candidacy for TNKase/ thrombectomy and above recommendations/plan. Delay in note entry/ note updates is due to patient care.  Non-neurologic findings seen on imaging to be worked up per primary team if applicable. Discussed patient care with primary team, patient and in agreement. Recommendations will be finalized/amended once signed by attending.  Patient Purvi Mclaughlin is a 83yo R handed woman PMHx HTN, HLD, TIA, afib on eliquis and asa who presents to ED with code stroke called. Accompanied by spouse. LKW with symptom onset 10PM 4/7/23. Developed dull headache of forehead (does report chronic history of headaches), new onset lightheadedness rather than dizziness when getting up to standing only. Reports also new onset LUE sleeve like heaviness, possible numbness. Otherwise no vision changes, double vision, slurred speech, word finding difficulty, chest pain, shortness of breath, gait instability (although she was leaning more towards L side).  On eliquis, last dose 10PM this night. Of note, patient reports switching from warfarin to eliquis since atleast aug 2022. She did not  want the frequent warfarin checks for which her cards Frank Gaffney switched her to eliquis. CT head, CTA, CTP obtained given concern for stroke.     LKW: 10 PM 4/7  NIHSS: 2    Baseline MRS: 0   Not a TNKase candidate due to recent eliquis use  Not a thrombectomy candidate due to  no large vessel occlusion    CT head w/o con: no acute hemorrhage  CTA head/neck w/ con: patent circulation without severe stenosis or occlusion. + atherosclerotic disease.  CT perfusion: no core/penumbra    Impression: chronic headache with new lightheadedness but importantly LUE heaviness, exam with L facial droop and L sided leaning when ambulating concerning for R hemispheric dysfunction potentially ischemic stroke. Unusual if ischemic stroke given patient on aspirin and eliquis concurrently. Still has lightheadedness and some LUE symptoms.      Recommendations:  [ ] No current neurovascular contraindication to continuing eliquis, aspirin for secondary stroke prevention. From stroke standpoint at this time, eliquis would suffice. Continuation of aspirin would be for other indications.    [ ] Atorvastatin 40-80 mg daily titrated per LDL < 70  [ ] HgbA1C, fasting lipid panel, CBC, CMP, coag panel, troponin  [ ] To decide regarding MRI pending additional workup.    [ ] Per HIE TTE EF 64%, mild to mod MR TR, normal globally LV dysfunction, no pulm HTN per Dr Oscar James note (unclear when echo was done)     [ ] tele, EKG, cardiac w/u per primary team  [ ] orthostatics  [ ] thyroid nodule w/u per primary team    - Glucose control   - Stroke education and counseling  - Neuro-checks and VS q4h  - Permissive HTN up to 220/120 for 24-48h from symptom onset  - Dysphagia screen.   - aspiration, fall precautions     Discussed with stroke attending Dr Carlos Manuel Overton regarding decision against candidacy for TNKase/ thrombectomy and above recommendations/plan. Delay in note entry/ note updates is due to patient care.  Non-neurologic findings seen on imaging to be worked up per primary team if applicable. Discussed patient care and updates frequently with primary team, patient and in agreement. Recommendations will be finalized/amended once signed by attending.  Patient Purvi Mclaughlin is a 81yo R handed woman PMHx HTN, HLD, TIA, afib on eliquis and asa who presents to ED with code stroke called. Accompanied by spouse. LKW with symptom onset 10PM 4/7/23. Developed dull headache of forehead (does report chronic history of headaches), new onset lightheadedness rather than dizziness when getting up to standing only. Reports also new onset LUE sleeve like heaviness, possible numbness. Otherwise no vision changes, double vision, slurred speech, word finding difficulty, chest pain, shortness of breath, gait instability (although she was leaning more towards L side).  On eliquis, last dose 10PM this night. Of note, patient reports switching from warfarin to eliquis since atleast aug 2022. She did not  want the frequent warfarin checks for which her cards Frank Gaffney switched her to eliquis. CT head, CTA, CTP obtained given concern for stroke.     LKW: 10 PM 4/7  NIHSS: 2    Baseline MRS: 0   Not a TNKase candidate due to recent eliquis use  Not a thrombectomy candidate due to  no large vessel occlusion    CT head w/o con: no acute hemorrhage  CTA head/neck w/ con: patent circulation without severe stenosis or occlusion. + atherosclerotic disease.  CT perfusion: no core/penumbra    Impression: chronic headache with new lightheadedness but importantly LUE heaviness, exam with L facial droop and L sided leaning when ambulating concerning for R hemispheric dysfunction potentially ischemic stroke. Unusual if ischemic stroke given patient on aspirin and eliquis concurrently. Still has lightheadedness and some LUE symptoms.      Recommendations:  [ ] cardiac w/u per primary team given lightheadedness, LUE heaviness  [ ] No current neurovascular contraindication to continuing eliquis, aspirin for secondary stroke prevention. From stroke standpoint at this time, eliquis would suffice. Continuation of aspirin would be for other indications.    [ ] Atorvastatin 40-80 mg daily titrated per LDL < 70  [ ] HgbA1C, fasting lipid panel, CBC, CMP, coag panel, troponin  [ ] MRI brain w/o con inpatient  [ ] Per HIE TTE EF 64%, mild to mod MR TR, normal globally LV dysfunction, no pulm HTN per Dr Oscar James note (unclear when echo was done). If done recently can defer from our standpoint, otherwise per cardiac standpoint or if found with new stroke should repeat echo depending on nature of stroke.     [ ] tele, EKG, cardiac w/u per primary team  [ ] orthostatics  [ ] thyroid nodule w/u per primary team    - Glucose control   - Stroke education and counseling  - Neuro-checks and VS q4h  - Permissive HTN up to 220/120 for 24-48h from symptom onset unless otherwise acutely contraindicated  - Dysphagia screen.   - aspiration, fall precautions     Discussed with stroke attending Dr Carlos Manuel Overton regarding decision against candidacy for TNKase/ thrombectomy and above recommendations/plan. Discussed overnight with neuro attending Dr Granger for above recommendations. Delay in note entry/ note updates is due to patient care.  Non-neurologic findings seen on imaging to be worked up per primary team if applicable. Discussed patient care and updates frequently with primary team, patient and in agreement. Recommendations will be finalized/amended once signed by attending.

## 2023-04-08 NOTE — OCCUPATIONAL THERAPY INITIAL EVALUATION ADULT - PERTINENT HX OF CURRENT PROBLEM, REHAB EVAL
82 year old female with history of A-fib, HTN, HLD presenting with c/o dizziness, gait instability, heaviness of left arm and headache. CT brain negative for acute CVA, pending MRI. Admitted for further management.

## 2023-04-08 NOTE — H&P ADULT - PROBLEM SELECTOR PLAN 1
[ ] cardiac w/u per primary team given lightheadedness, LUE heaviness  [ ] No current neurovascular contraindication to continuing eliquis, aspirin for secondary stroke prevention. From stroke standpoint at this time, eliquis would suffice. Continuation of aspirin would be for other indications.    [ ] Atorvastatin 40-80 mg daily titrated per LDL < 70  [ ] HgbA1C, fasting lipid panel, CBC, CMP, coag panel, troponin  [ ] MRI brain w/o con inpatient  [ ] Per HIE TTE EF 64%, mild to mod MR TR, normal globally LV dysfunction, no pulm HTN per Dr [facility] Trust note (unclear when echo was done). If done recently can defer from our standpoint, otherwise per cardiac standpoint or if found with new stroke should repeat echo depending on nature of stroke.     [ ] tele, EKG, cardiac w/u per primary team  [ ] orthostatics  [ ] thyroid nodule w/u per primary team    - Glucose control   - Stroke education and counseling  - Neuro-checks and VS q4h  - Permissive HTN up to 220/120 for 24-48h from symptom onset unless otherwise acutely contraindicated  - Dysphagia screen.   - aspiration, fall precautions Acute vertigo, L arm heaviness/ weakness; Symptoms in resolution; CVA vs TIA; Passed dysphagia screen;   c/w Eliquis, Aspirin   c/w Atorvastatin 80 mg   HgbA1C, fasting lipid panel added on   MRI brain w/o con inpatient  TTE ordered  Telemetry   Stroke education and counseling  Neuro-checks and VS q4h  Permissive HTN up to 220/120 for 24-48h   Dysphagia screen: passed  Aspiration, fall precautions

## 2023-04-08 NOTE — H&P ADULT - PROBLEM SELECTOR PLAN 3
Hold Metoprolol and Olmesartan due to permissive hypertension EKG: c/w NSR and chronic RBBB  [id number] risk stratification score 6  c/w Eliquis   TSH  added on   Reduced dose of Metoprolol due to permissive HTN for now

## 2023-04-08 NOTE — H&P ADULT - NSHPLABSRESULTS_GEN_ALL_CORE
.    -Personally interpreted EKG:     -Personally reviewed labs below:                          12.7   6.33  )-----------( 246      ( 08 Apr 2023 00:26 )             39.1   04-08    141  |  105  |  20  ----------------------------<  130<H>  4.2   |  23  |  0.91    Ca    9.8      08 Apr 2023 00:26    TPro  6.7  /  Alb  4.5  /  TBili  0.3  /  DBili  x   /  AST  29  /  ALT  23  /  AlkPhos  80  04-08    -Personally reviewed:    CT ANGIO BRAIN STROKE PROTC IC     CT ANGIO NECK STROKE PROTCL IC     CT BRAIN STROKE PROTOCOL     CT BRAIN PERFUSION MAPS STROKE     PROCEDURE DATE:  04/08/2023   IMPRESSION:    CT HEAD: No acute intracranial hemorrhage or mass effect.  CTA BRAIN: Patent intracranial circulation. No flow-limiting stenosis or   occlusion.  CTA NECK: Patent cervical vasculature. No flow limiting stenosis or   occlusion.  CT perfusion: No core infarct or penumbra. .    -Personally interpreted EKG: nsr 75bpm, qtc 444, incomplete RBBB, Tw inv in V2-V6, III, aVF, no acute ST changes     -Personally reviewed labs below:                          12.7   6.33  )-----------( 246      ( 08 Apr 2023 00:26 )             39.1   04-08    141  |  105  |  20  ----------------------------<  130<H>  4.2   |  23  |  0.91    Ca    9.8      08 Apr 2023 00:26    TPro  6.7  /  Alb  4.5  /  TBili  0.3  /  DBili  x   /  AST  29  /  ALT  23  /  AlkPhos  80  04-08    -Personally reviewed:    CT ANGIO BRAIN STROKE PROTC IC     CT ANGIO NECK STROKE PROTCL IC     CT BRAIN STROKE PROTOCOL     CT BRAIN PERFUSION MAPS STROKE     PROCEDURE DATE:  04/08/2023   IMPRESSION:    CT HEAD: No acute intracranial hemorrhage or mass effect.  CTA BRAIN: Patent intracranial circulation. No flow-limiting stenosis or   occlusion.  CTA NECK: Patent cervical vasculature. No flow limiting stenosis or   occlusion.  CT perfusion: No core infarct or penumbra.

## 2023-04-08 NOTE — PATIENT PROFILE ADULT - HEALTH LITERACY UNDERSTANDING DOCTOR- DETAILS
Sometimes the doctor gives me so much information at once that I can't comprehend especially when using medical terms

## 2023-04-08 NOTE — ED ADULT NURSE NOTE - NSIMPLEMENTINTERV_GEN_ALL_ED
Implemented All Universal Safety Interventions:  [place] to call system. Call bell, personal items and telephone within reach. Instruct patient to call for assistance. Room bathroom lighting operational. Non-slip footwear when patient is off stretcher. Physically safe environment: no spills, clutter or unnecessary equipment. Stretcher in lowest position, wheels locked, appropriate side rails in place.

## 2023-04-08 NOTE — CHART NOTE - NSCHARTNOTEFT_GEN_A_CORE
Patient seen at bedside with attending on morning rounds. No acute events overnight. Patient reports resolution of L face numbness, L arm heaviness, but now endorses mild throbbing HA. Patient endorses hx of HA/migraine in the past but reports this is different and has never experienced associated symptoms like this before. Also reports persisting lightheadedness/dizziness and ?scintillating scotoma. On exam, patient with bidirectional & upgaze nystagmus, subtle drift in LUE/LLE. Hyporeflexive on LUE/LLE, grossly, compared to RUE/RLE. Gait was unsteady and slightly wide based. Patient's presentation appears consistent with R brainstem dysfunction from acute ischemic stroke of probably cardioembolic mechanism. Pending stroke w/u including MRI brain w/o contrast. No need for additional antiplatelets while on Apixaban, can discontinue ASA unless otherwise indicated.     Patient case discussed and seen with neurology attending, Dr. Granger. Please see attending attestation for updated/finalized impression & recommendations.     Please call with questions: [phone]

## 2023-04-08 NOTE — ED ADULT NURSE NOTE - CHIEF COMPLAINT QUOTE
Pt reporting to the ED for L arm weakness and L facial "tingling" starting at ~ 10 pm. Reports intermittent dizziness and headaches. PMH of TIA, Afib.  MD Bloch called for stroke eval. yes

## 2023-04-08 NOTE — CONSULT NOTE ADULT - SUBJECTIVE AND OBJECTIVE BOX
Neurology Consultation     HPI: Patient KANE BURNS is a 83yo F PMHx HTN, HLD, afib on eliquis who presents to ED with code stroke called. Accompanied by spouse. LKW with symptom onset 10PM 4/7/23. Developed dull headache of forehead (does report chronic history of headaches), new onset lightheadedness than dizziness when getting up to standing only. Reports also new onset LUE sleeve like heaviness, possible numbness. Otherwise no vision changes, double vision, slurred speech, word finding difficulty, chest pain, shortness of breath, gait instability (although she was leaning more towards L side).  On eliquis, last dose 10PM this night. CT head, CTA, CTP obtained given concern for stroke.     NIHSS: 2   preMRS: 0    PAST MEDICAL & SURGICAL HISTORY:  HTN (hypertension)    HLD (hyperlipidemia)    TIA (transient ischemic attack)    H/O bilateral hip replacements    H/O total knee replacement    FAMILY HISTORY:    MEDICATIONS   MEDICATIONS  (STANDING):    MEDICATIONS  (PRN):    ALLERGIES/INTOLERANCES:  Allergies  No Known Allergies    Intolerances    VITALS & EXAMINATION:  Vital Signs Last 24 Hrs  T(C): 36.7 (07 Apr 2023 23:36), Max: 36.7 (07 Apr 2023 23:36)  T(F): 98 (07 Apr 2023 23:36), Max: 98 (07 Apr 2023 23:36)  HR: 85 (07 Apr 2023 23:36) (85 - 85)  BP: 159/94 (07 Apr 2023 23:36) (159/94 - 159/94)  BP(mean): --  RR: 18 (07 Apr 2023 23:36) (18 - 18)  SpO2: 100% (07 Apr 2023 23:36) (100% - 100%)    Parameters below as of 07 Apr 2023 23:36  Patient On (Oxygen Delivery Method): room air     General:  Constitutional: Female, appears stated age, nontoxic, not in distress,    Head: Normocephalic;   Eyes: clear sclera;   Extremities: No cyanosis;   Resp: breathing comfortably     Neurological (>12):  MS: Awake, alert.  Oriented person place situation. Follows commands. Attends to examiner  Language: Speech is clear, fluent, good repetition, comprehension, registration of words.  CNs: Pupils equal 2mm bilaterally, difficult to assess reactivity. VFF. EOMI, R gaze nystagmus only. V1-3 intact LT, Mild L facial droop. Hearing grossly normal b/l. Tongue midline.     Motor - Normal bulk and tone throughout. No pronator drift.    L/R         Deltoid  5/5    Biceps   5/5      Triceps  5/5         Wrist Extension 5/5   Wrist Flexion  5/5      5/5   L/R         Hip Flexion  5/5     Knee Extension  5/5  Dorsiflexion  5/5      Plantar Flexion 5/5     Sensation: Possible sensory difference of LUE. Cortical: Extinction on DSS (neglect): none  Coordination: No dysmetria to FTN b/l UE  Gait: when ambulating, cautious with slight leaning towards L side when walking.     LABORATORY:  CBC   Chem    LFTs   Coagulopathy   Lipid Panel   A1c   Cardiac enzymes     U/A   CSF  Other    STUDIES & IMAGING: (EEG, CT, MR, U/S, TTE/CARLA): Neurology Consultation     HPI: Patient KANE BURNS is a 81yo F PMHx HTN, HLD, TIA, afib on eliquis and asa who presents to ED with code stroke called. Accompanied by spouse. LKW with symptom onset 10PM 4/7/23. Developed dull headache of forehead (does report chronic history of headaches), new onset lightheadedness than dizziness when getting up to standing only. Reports also new onset LUE sleeve like heaviness, possible numbness. Otherwise no vision changes, double vision, slurred speech, word finding difficulty, chest pain, shortness of breath, gait instability (although she was leaning more towards L side).  On eliquis, last dose 10PM this night. CT head, CTA, CTP obtained given concern for stroke.     NIHSS: 2   preMRS: 0    PAST MEDICAL & SURGICAL HISTORY:  HTN (hypertension)    HLD (hyperlipidemia)    TIA (transient ischemic attack)    H/O bilateral hip replacements    H/O total knee replacement    FAMILY HISTORY:    MEDICATIONS   MEDICATIONS  (STANDING):    MEDICATIONS  (PRN):    ALLERGIES/INTOLERANCES:  Allergies  No Known Allergies    Intolerances    VITALS & EXAMINATION:  Vital Signs Last 24 Hrs  T(C): 36.7 (07 Apr 2023 23:36), Max: 36.7 (07 Apr 2023 23:36)  T(F): 98 (07 Apr 2023 23:36), Max: 98 (07 Apr 2023 23:36)  HR: 85 (07 Apr 2023 23:36) (85 - 85)  BP: 159/94 (07 Apr 2023 23:36) (159/94 - 159/94)  BP(mean): --  RR: 18 (07 Apr 2023 23:36) (18 - 18)  SpO2: 100% (07 Apr 2023 23:36) (100% - 100%)    Parameters below as of 07 Apr 2023 23:36  Patient On (Oxygen Delivery Method): room air     General:  Constitutional: Female, appears stated age, nontoxic, not in distress,    Head: Normocephalic;   Eyes: clear sclera;   Extremities: No cyanosis;   Resp: breathing comfortably     Neurological (>12):  MS: Awake, alert.  Oriented person place situation. Follows commands. Attends to examiner  Language: Speech is clear, fluent, good repetition, comprehension, registration of words.  CNs: Pupils equal 2mm bilaterally, difficult to assess reactivity. VFF. EOMI, R gaze nystagmus only. V1-3 intact LT, Mild L facial droop. Hearing grossly normal b/l. Tongue midline.     Motor - Normal bulk and tone throughout. No pronator drift.    L/R         Deltoid  5/5    Biceps   5/5      Triceps  5/5         Wrist Extension 5/5   Wrist Flexion  5/5      5/5   L/R         Hip Flexion  5/5     Knee Extension  5/5  Dorsiflexion  5/5      Plantar Flexion 5/5     Sensation: Possible sensory difference of LUE. Cortical: Extinction on DSS (neglect): none  Coordination: No dysmetria to FTN b/l UE  Gait: when ambulating, cautious with slight leaning towards L side when walking.     LABORATORY:  CBC   Chem    LFTs   Coagulopathy   Lipid Panel   A1c   Cardiac enzymes     U/A   CSF  Other    STUDIES & IMAGING: (EEG, CT, MR, U/S, TTE/CARLA): Neurology Consultation     HPI: Patient Purvi Mclaughlin is a 83yo R handed woman PMHx HTN, HLD, TIA, afib on eliquis and asa who presents to ED with code stroke called. Accompanied by spouse. LKW with symptom onset 10PM 4/7/23. Developed dull headache of forehead (does report chronic history of headaches), new onset lightheadedness rather than dizziness when getting up to standing only. Reports also new onset LUE sleeve like heaviness, possible numbness. Otherwise no vision changes, double vision, slurred speech, word finding difficulty, chest pain, shortness of breath, gait instability (although she was leaning more towards L side).  On eliquis, last dose 10PM this night. CT head, CTA, CTP obtained given concern for stroke.     NIHSS: 2   preMRS: 0    PAST MEDICAL & SURGICAL HISTORY:  HTN (hypertension)    HLD (hyperlipidemia)    TIA (transient ischemic attack)    H/O bilateral hip replacements    H/O total knee replacement    FAMILY HISTORY:    MEDICATIONS   MEDICATIONS  (STANDING):    MEDICATIONS  (PRN):    ALLERGIES/INTOLERANCES:  Allergies  No Known Allergies    Intolerances    VITALS & EXAMINATION:  Vital Signs Last 24 Hrs  T(C): 36.7 (07 Apr 2023 23:36), Max: 36.7 (07 Apr 2023 23:36)  T(F): 98 (07 Apr 2023 23:36), Max: 98 (07 Apr 2023 23:36)  HR: 85 (07 Apr 2023 23:36) (85 - 85)  BP: 159/94 (07 Apr 2023 23:36) (159/94 - 159/94)  BP(mean): --  RR: 18 (07 Apr 2023 23:36) (18 - 18)  SpO2: 100% (07 Apr 2023 23:36) (100% - 100%)    Parameters below as of 07 Apr 2023 23:36  Patient On (Oxygen Delivery Method): room air    General:  Constitutional: Female, appears stated age, nontoxic, not in distress,    Head: Normocephalic;   Eyes: clear sclera;   Extremities: No cyanosis;   Resp: breathing comfortably   Fundoscopic exam: difficult to assess given pupil size    Neurological (>12):  MS: Awake, alert.  Oriented person place situation. Follows commands. Attends to examiner  Language: Speech is clear, fluent, good repetition, comprehension, registration of words.  CNs: Pupils equal 2mm bilaterally, difficult to assess reactivity. VFF. EOMI, R gaze nystagmus. V1-3 intact LT, Mild lower L facial droop. Hearing grossly normal b/l. Tongue midline.     Motor - Normal bulk and tone throughout. No pronator drift.    L/R         Deltoid  5/5    Biceps   5/5      Triceps  5/5         Wrist Extension 5/5   Wrist Flexion  5/5      5/5   L/R         Hip Flexion  5/5     Knee Extension  5/5  Dorsiflexion  5/5      Plantar Flexion 5/5     Sensation: Possible sensory difference of LUE. Cortical: Extinction on DSS (neglect): none  Coordination: No dysmetria to FTN b/l UE  Gait: when ambulating, cautious with slight leaning towards L side when walking.     LABORATORY:                        12.7   6.33  )-----------( 246      ( 08 Apr 2023 00:26 )             39.1   04-08    141  |  105  |  20  ----------------------------<  130<H>  4.2   |  23  |  0.91    Ca    9.8      08 Apr 2023 00:26    TPro  6.7  /  Alb  4.5  /  TBili  0.3  /  DBili  x   /  AST  29  /  ALT  23  /  AlkPhos  80  04-08      LFTs   Coagulopathy   Lipid Panel   A1c   Cardiac enzymes     U/A   CSF  Other    STUDIES & IMAGING: (EEG, CT, MR, U/S, TTE/CARLA):     < from: CT Brain Perfusion Maps Stroke (04.08.23 @ 00:09) >    ACC: [id number] EXAM:  CT ANGIO BRAIN STROKE PROTC IC   ORDERED BY: LUBA KAUFMAN     ACC: [id number] EXAM:  CT ANGIO NECK STROKE PROTCL IC   ORDERED BY: LUBA KAUFMAN     ACC: [id number] EXAM:  CT BRAIN STROKE PROTOCOL   ORDERED BY: LUBA KAUFMAN     ACC: [id number]:  CT BRAIN PERFUSION MAPS STROKE   ORDERED BY: LUBA KAUFMAN     PROCEDURE DATE:  04/08/2023          INTERPRETATION:  HISTORY: Code stroke. Left upper extremity heaviness and   left facial droop from 10:00 PM on 4/7/2023.    COMPARISON: CT head and cervical spine 1/11/2018.    TECHNIQUE: Noncontrast CT head and CT angiogram of the neck and brain was   performed after administration of intravenous contrast. MIP and 3D   reconstructions were performed.    Total of 140 cc Omnipaque 350 intravenous contrast were administered   without complication.    FINDINGS:    CT HEAD:    There is no acute intracranial mass-effect, hemorrhage, midline shift, or   abnormal extra-axial fluid collection.    Patchy hypoattenuation within white matter likely representing chronic   ischemic microvascular changes. No acute large territory infarct.    Ventricles, sulci, and cisterns are normal in size for the patient's age   without hydrocephalus.    Basal cisterns are patent. Status post bilateral ocular lensreplacements.    Visualized paranasal sinuses and mastoid air cells are clear. Calvarium   is intact. There is mild hyperostosis frontalis interna. There are   degenerative changes of the bilateral temporomandibular joints.    CTA BRAIN    INTERNAL CAROTID ARTERIES:  The intracranial segments of the ICA are   patent without hemodynamically significant stenosis, occlusion, or   aneurysm. Atherosclerotic calcifications of the bilateral cavernous and   supraclinoid ICA segments. The ICA bifurcationsare unremarkable.    ANTERIOR CEREBRAL ARTERIES: Hypoplastic A1 segment of MEHCELLE. No   flow-limiting stenosis or occlusion.    MIDDLE CEREBRAL ARTERIES: No flow-limiting stenosis or occlusion. MCA   bifurcations are unremarkable without aneurysm.    POSTERIOR CEREBRAL ARTERIES: Fetal origin of left PCA. No flow-limiting   stenosis or occlusion. Right posterior communicating artery is visualized.    VERTEBROBASILAR SYSTEM: Right vertebral artery dominance. Calcific   atherosclerotic disease of the right V4 segment. No flow-limiting   stenosis or occlusion. Basilar tip is unremarkable.    CTA NECK    RIGHT CAROTID SYSTEM: Normal in course and caliber without flow-limiting   stenosis or occlusion.    LEFT CAROTID SYSTEM: Normal in course and caliber without flow-limiting   stenosis or occlusion. Atherosclerotic calcification of the carotid   bifurcation.    VERTEBRAL SYSTEM:  Normal in course and caliber  without flow-limiting   stenosis or occlusion. Origin of the vertebral arteries are unremarkable.    AORTIC ARCH: Origin of the great vessels are patent. Common origin of the   left common carotid and innominate arteries, a normal variant.   Atherosclerotic calcifications of the aorta and great vessel origins.   Coronary artery calcifications.    Multilevel degenerative changes in the spine. Subcentimeter   hypoattenuating left thyroid nodule, incompletely characterized. The   visualized lung apices are grossly unremarkable.    CTA RAPID PERFUSION:    CBF<30% volume: 0 ml  Tmax>6.0 s volume: 0 ml  Mismatch volume: 0 ml  Mismatch ratio: none    CORE INFARCT: None.  TISSUE AT RISK: None.  MISMATCH RATIO: None.        IMPRESSION:    CT HEAD: No acute intracranial hemorrhage or mass effect.    CTA BRAIN: Patent intracranial circulation. No flow-limiting stenosis or   occlusion.    CTA NECK: Patent cervical vasculature. No flow limiting stenosis or   occlusion.    CT perfusion: No core infarct or penumbra.      These findings were discussed with Dr. Dominguez /2023 12:22 a.m. by Dr. Puentes of Radiology with read back confirmation.    --- End of Report ---    DIANA PUENTES MD; Resident Radiologist  This document has been electronically signed.  CARLOTA VALERIO MD; Attending Radiologist  This document has been electronically signed. Apr 8 2023  1:05AM    < end of copied text >   Neurology Consultation     HPI: Patient Purvi Mclaughlin is a 83yo R handed woman PMHx HTN, HLD, TIA, afib on eliquis and asa who presents to ED with code stroke called. Accompanied by spouse. LKW with symptom onset 10PM 4/7/23. Developed dull headache of forehead (does report chronic history of headaches), new onset lightheadedness rather than dizziness when getting up to standing only. Reports also new onset LUE sleeve like heaviness, possible numbness. Otherwise no vision changes, double vision, slurred speech, word finding difficulty, chest pain, shortness of breath, gait instability (although she was leaning more towards L side).  On eliquis, last dose 10PM this night. Of note, patient reports switching from warfarin to eliquis ~3 mo ago. Unclear specifically indication of warfarin and nature of her afib but she did not  want the frequent warfarin checks for which her cards Frank Gaffney switched her.  CT head, CTA, CTP obtained given concern for stroke.     NIHSS: 2   preMRS: 0    PAST MEDICAL & SURGICAL HISTORY:  HTN (hypertension)    HLD (hyperlipidemia)    TIA (transient ischemic attack)    H/O bilateral hip replacements    H/O total knee replacement    FAMILY HISTORY:    MEDICATIONS   MEDICATIONS  (STANDING):    MEDICATIONS  (PRN):    ALLERGIES/INTOLERANCES:  Allergies  No Known Allergies    Intolerances    VITALS & EXAMINATION:  Vital Signs Last 24 Hrs  T(C): 36.7 (07 Apr 2023 23:36), Max: 36.7 (07 Apr 2023 23:36)  T(F): 98 (07 Apr 2023 23:36), Max: 98 (07 Apr 2023 23:36)  HR: 85 (07 Apr 2023 23:36) (85 - 85)  BP: 159/94 (07 Apr 2023 23:36) (159/94 - 159/94)  BP(mean): --  RR: 18 (07 Apr 2023 23:36) (18 - 18)  SpO2: 100% (07 Apr 2023 23:36) (100% - 100%)    Parameters below as of 07 Apr 2023 23:36  Patient On (Oxygen Delivery Method): room air    General:  Constitutional: Female, appears stated age, nontoxic, not in distress,    Head: Normocephalic;   Eyes: clear sclera;   Extremities: No cyanosis;   Resp: breathing comfortably   Fundoscopic exam: difficult to assess given pupil size    Neurological (>12):  MS: Awake, alert.  Oriented person place situation. Follows commands. Attends to examiner  Language: Speech is clear, fluent, good repetition, comprehension, registration of words.  CNs: Pupils equal 2mm bilaterally, difficult to assess reactivity. VFF. EOMI, R gaze nystagmus. V1-3 intact LT, Mild lower L facial droop. Hearing grossly normal b/l. Tongue midline.     Motor - Normal bulk and tone throughout. No pronator drift.    L/R         Deltoid  5/5    Biceps   5/5      Triceps  5/5         Wrist Extension 5/5   Wrist Flexion  5/5      5/5   L/R         Hip Flexion  5/5     Knee Extension  5/5  Dorsiflexion  5/5      Plantar Flexion 5/5     Sensation: Possible sensory difference of LUE. Cortical: Extinction on DSS (neglect): none  Coordination: No dysmetria to FTN b/l UE  Gait: when ambulating, cautious with slight leaning towards L side when walking.     LABORATORY:                        12.7   6.33  )-----------( 246      ( 08 Apr 2023 00:26 )             39.1   04-08    141  |  105  |  20  ----------------------------<  130<H>  4.2   |  23  |  0.91    Ca    9.8      08 Apr 2023 00:26    TPro  6.7  /  Alb  4.5  /  TBili  0.3  /  DBili  x   /  AST  29  /  ALT  23  /  AlkPhos  80  04-08      LFTs   Coagulopathy   Lipid Panel   A1c   Cardiac enzymes     U/A   CSF  Other    STUDIES & IMAGING: (EEG, CT, MR, U/S, TTE/CARLA):     < from: CT Brain Perfusion Maps Stroke (04.08.23 @ 00:09) >    ACC: [id number] EXAM:  CT ANGIO BRAIN STROKE PROTC IC   ORDERED BY: LUBA KAUFMAN     ACC: [id number] EXAM:  CT ANGIO NECK STROKE PROTCL IC   ORDERED BY: LUBA KAUFMAN     ACC: [id number] EXAM:  CT BRAIN STROKE PROTOCOL   ORDERED BY: LUBA KAUFMAN     ACC: [id number]:  CT BRAIN PERFUSION MAPS STROKE   ORDERED BY: LUBA KAUFMAN     PROCEDURE DATE:  04/08/2023          INTERPRETATION:  HISTORY: Code stroke. Left upper extremity heaviness and   left facial droop from 10:00 PM on 4/7/2023.    COMPARISON: CT head and cervical spine 1/11/2018.    TECHNIQUE: Noncontrast CT head and CT angiogram of the neck and brain was   performed after administration of intravenous contrast. MIP and 3D   reconstructions were performed.    Total of 140 cc Omnipaque 350 intravenous contrast were administered   without complication.    FINDINGS:    CT HEAD:    There is no acute intracranial mass-effect, hemorrhage, midline shift, or   abnormal extra-axial fluid collection.    Patchy hypoattenuation within white matter likely representing chronic   ischemic microvascular changes. No acute large territory infarct.    Ventricles, sulci, and cisterns are normal in size for the patient's age   without hydrocephalus.    Basal cisterns are patent. Status post bilateral ocular lensreplacements.    Visualized paranasal sinuses and mastoid air cells are clear. Calvarium   is intact. There is mild hyperostosis frontalis interna. There are   degenerative changes of the bilateral temporomandibular joints.    CTA BRAIN    INTERNAL CAROTID ARTERIES:  The intracranial segments of the ICA are   patent without hemodynamically significant stenosis, occlusion, or   aneurysm. Atherosclerotic calcifications of the bilateral cavernous and   supraclinoid ICA segments. The ICA bifurcationsare unremarkable.    ANTERIOR CEREBRAL ARTERIES: Hypoplastic A1 segment of MECHELLE. No   flow-limiting stenosis or occlusion.    MIDDLE CEREBRAL ARTERIES: No flow-limiting stenosis or occlusion. MCA   bifurcations are unremarkable without aneurysm.    POSTERIOR CEREBRAL ARTERIES: Fetal origin of left PCA. No flow-limiting   stenosis or occlusion. Right posterior communicating artery is visualized.    VERTEBROBASILAR SYSTEM: Right vertebral artery dominance. Calcific   atherosclerotic disease of the right V4 segment. No flow-limiting   stenosis or occlusion. Basilar tip is unremarkable.    CTA NECK    RIGHT CAROTID SYSTEM: Normal in course and caliber without flow-limiting   stenosis or occlusion.    LEFT CAROTID SYSTEM: Normal in course and caliber without flow-limiting   stenosis or occlusion. Atherosclerotic calcification of the carotid   bifurcation.    VERTEBRAL SYSTEM:  Normal in course and caliber  without flow-limiting   stenosis or occlusion. Origin of the vertebral arteries are unremarkable.    AORTIC ARCH: Origin of the great vessels are patent. Common origin of the   left common carotid and innominate arteries, a normal variant.   Atherosclerotic calcifications of the aorta and great vessel origins.   Coronary artery calcifications.    Multilevel degenerative changes in the spine. Subcentimeter   hypoattenuating left thyroid nodule, incompletely characterized. The   visualized lung apices are grossly unremarkable.    CTA RAPID PERFUSION:    CBF<30% volume: 0 ml  Tmax>6.0 s volume: 0 ml  Mismatch volume: 0 ml  Mismatch ratio: none    CORE INFARCT: None.  TISSUE AT RISK: None.  MISMATCH RATIO: None.        IMPRESSION:    CT HEAD: No acute intracranial hemorrhage or mass effect.    CTA BRAIN: Patent intracranial circulation. No flow-limiting stenosis or   occlusion.    CTA NECK: Patent cervical vasculature. No flow limiting stenosis or   occlusion.    CT perfusion: No core infarct or penumbra.      These findings were discussed with Dr. Dominguez /2023 12:22 a.m. by Dr. Puentes of Radiology with read back confirmation.    --- End of Report ---    DIANA PUENTES MD; Resident Radiologist  This document has been electronically signed.  CARLOTA VALERIO MD; Attending Radiologist  This document has been electronically signed. Apr 8 2023  1:05AM    < end of copied text >   Neurology Consultation     HPI: Patient Purvi Mclaughlin is a 81yo R handed woman PMHx HTN, HLD, TIA, afib on eliquis and asa who presents to ED with code stroke called. Accompanied by spouse. LKW with symptom onset 10PM 4/7/23. Developed dull headache of forehead (does report chronic history of headaches), new onset lightheadedness rather than dizziness when getting up to standing only. Reports also new onset LUE sleeve like heaviness, possible numbness. Otherwise no vision changes, double vision, slurred speech, word finding difficulty, chest pain, shortness of breath, gait instability (although she was leaning more towards L side).  On eliquis, last dose 10PM this night. Of note, patient reports switching from warfarin to eliquis since at least aug 2022 ago. She did not  want the frequent warfarin checks for which her cards Frank Gaffney switched her to eliquis.  CT head, CTA, CTP obtained given concern for stroke.     NIHSS: 2   preMRS: 0    PAST MEDICAL & SURGICAL HISTORY:  HTN (hypertension)    HLD (hyperlipidemia)    TIA (transient ischemic attack)    H/O bilateral hip replacements    H/O total knee replacement    FAMILY HISTORY:    MEDICATIONS   MEDICATIONS  (STANDING):    MEDICATIONS  (PRN):    ALLERGIES/INTOLERANCES:  Allergies  No Known Allergies    Intolerances    VITALS & EXAMINATION:  Vital Signs Last 24 Hrs  T(C): 36.7 (07 Apr 2023 23:36), Max: 36.7 (07 Apr 2023 23:36)  T(F): 98 (07 Apr 2023 23:36), Max: 98 (07 Apr 2023 23:36)  HR: 85 (07 Apr 2023 23:36) (85 - 85)  BP: 159/94 (07 Apr 2023 23:36) (159/94 - 159/94)  BP(mean): --  RR: 18 (07 Apr 2023 23:36) (18 - 18)  SpO2: 100% (07 Apr 2023 23:36) (100% - 100%)    Parameters below as of 07 Apr 2023 23:36  Patient On (Oxygen Delivery Method): room air    General:  Constitutional: Female, appears stated age, nontoxic, not in distress,    Head: Normocephalic;   Eyes: clear sclera;   Extremities: No cyanosis;   Resp: breathing comfortably   Fundoscopic exam: difficult to assess given pupil size    Neurological (>12):  MS: Awake, alert.  Oriented person place situation. Follows commands. Attends to examiner  Language: Speech is clear, fluent, good repetition, comprehension, registration of words.  CNs: Pupils equal 2mm bilaterally, difficult to assess reactivity. VFF. EOMI, R gaze nystagmus. V1-3 intact LT, Mild lower L facial droop. Hearing grossly normal b/l. Tongue midline.     Motor - Normal bulk and tone throughout. No pronator drift.    L/R         Deltoid  5/5    Biceps   5/5      Triceps  5/5         Wrist Extension 5/5   Wrist Flexion  5/5      5/5   L/R         Hip Flexion  5/5     Knee Extension  5/5  Dorsiflexion  5/5      Plantar Flexion 5/5     Sensation: Possible sensory difference of LUE. Cortical: Extinction on DSS (neglect): none  Coordination: No dysmetria to FTN b/l UE  Gait: when ambulating, cautious with slight leaning towards L side when walking.     LABORATORY:                        12.7   6.33  )-----------( 246      ( 08 Apr 2023 00:26 )             39.1   04-08    141  |  105  |  20  ----------------------------<  130<H>  4.2   |  23  |  0.91    Ca    9.8      08 Apr 2023 00:26    TPro  6.7  /  Alb  4.5  /  TBili  0.3  /  DBili  x   /  AST  29  /  ALT  23  /  AlkPhos  80  04-08      LFTs   Coagulopathy   Lipid Panel   A1c   Cardiac enzymes     U/A   CSF  Other    STUDIES & IMAGING: (EEG, CT, MR, U/S, TTE/CARLA):     < from: CT Brain Perfusion Maps Stroke (04.08.23 @ 00:09) >    ACC: [id number] EXAM:  CT ANGIO BRAIN STROKE PROTC IC   ORDERED BY: LUBA KAUFMAN     ACC: [id number] EXAM:  CT ANGIO NECK STROKE PROTCL IC   ORDERED BY: LUBA KAUFMAN     ACC: [id number] EXAM:  CT BRAIN STROKE PROTOCOL   ORDERED BY: LUBA KAUFMAN     ACC: [id number]:  CT BRAIN PERFUSION MAPS STROKE   ORDERED BY: LUBA KAUFMAN     PROCEDURE DATE:  04/08/2023          INTERPRETATION:  HISTORY: Code stroke. Left upper extremity heaviness and   left facial droop from 10:00 PM on 4/7/2023.    COMPARISON: CT head and cervical spine 1/11/2018.    TECHNIQUE: Noncontrast CT head and CT angiogram of the neck and brain was   performed after administration of intravenous contrast. MIP and 3D   reconstructions were performed.    Total of 140 cc Omnipaque 350 intravenous contrast were administered   without complication.    FINDINGS:    CT HEAD:    There is no acute intracranial mass-effect, hemorrhage, midline shift, or   abnormal extra-axial fluid collection.    Patchy hypoattenuation within white matter likely representing chronic   ischemic microvascular changes. No acute large territory infarct.    Ventricles, sulci, and cisterns are normal in size for the patient's age   without hydrocephalus.    Basal cisterns are patent. Status post bilateral ocular lensreplacements.    Visualized paranasal sinuses and mastoid air cells are clear. Calvarium   is intact. There is mild hyperostosis frontalis interna. There are   degenerative changes of the bilateral temporomandibular joints.    CTA BRAIN    INTERNAL CAROTID ARTERIES:  The intracranial segments of the ICA are   patent without hemodynamically significant stenosis, occlusion, or   aneurysm. Atherosclerotic calcifications of the bilateral cavernous and   supraclinoid ICA segments. The ICA bifurcationsare unremarkable.    ANTERIOR CEREBRAL ARTERIES: Hypoplastic A1 segment of MECHELLE. No   flow-limiting stenosis or occlusion.    MIDDLE CEREBRAL ARTERIES: No flow-limiting stenosis or occlusion. MCA   bifurcations are unremarkable without aneurysm.    POSTERIOR CEREBRAL ARTERIES: Fetal origin of left PCA. No flow-limiting   stenosis or occlusion. Right posterior communicating artery is visualized.    VERTEBROBASILAR SYSTEM: Right vertebral artery dominance. Calcific   atherosclerotic disease of the right V4 segment. No flow-limiting   stenosis or occlusion. Basilar tip is unremarkable.    CTA NECK    RIGHT CAROTID SYSTEM: Normal in course and caliber without flow-limiting   stenosis or occlusion.    LEFT CAROTID SYSTEM: Normal in course and caliber without flow-limiting   stenosis or occlusion. Atherosclerotic calcification of the carotid   bifurcation.    VERTEBRAL SYSTEM:  Normal in course and caliber  without flow-limiting   stenosis or occlusion. Origin of the vertebral arteries are unremarkable.    AORTIC ARCH: Origin of the great vessels are patent. Common origin of the   left common carotid and innominate arteries, a normal variant.   Atherosclerotic calcifications of the aorta and great vessel origins.   Coronary artery calcifications.    Multilevel degenerative changes in the spine. Subcentimeter   hypoattenuating left thyroid nodule, incompletely characterized. The   visualized lung apices are grossly unremarkable.    CTA RAPID PERFUSION:    CBF<30% volume: 0 ml  Tmax>6.0 s volume: 0 ml  Mismatch volume: 0 ml  Mismatch ratio: none    CORE INFARCT: None.  TISSUE AT RISK: None.  MISMATCH RATIO: None.        IMPRESSION:    CT HEAD: No acute intracranial hemorrhage or mass effect.    CTA BRAIN: Patent intracranial circulation. No flow-limiting stenosis or   occlusion.    CTA NECK: Patent cervical vasculature. No flow limiting stenosis or   occlusion.    CT perfusion: No core infarct or penumbra.      These findings were discussed with Dr. Dominguez /2023 12:22 a.m. by Dr. Puentes of Radiology with read back confirmation.    --- End of Report ---    DIANA PUENTES MD; Resident Radiologist  This document has been electronically signed.  CARLOTA VALERIO MD; Attending Radiologist  This document has been electronically signed. Apr 8 2023  1:05AM    < end of copied text >

## 2023-04-08 NOTE — OCCUPATIONAL THERAPY INITIAL EVALUATION ADULT - GENERAL OBSERVATIONS, REHAB EVAL
Patient received semisupine in bed in NAD; agreeable to participate in OT evaluation. +tele. HR 73 bpm.

## 2023-04-08 NOTE — PHYSICAL THERAPY INITIAL EVALUATION ADULT - ADDITIONAL COMMENTS
Patient lives with  and daughter with her son living downstairs. Patient has flight to get to bedroom/bathroom. Patient was independent in all ADL prior to admission. Patient was not using an assistive device but owns  cane and walker from prior surgery.

## 2023-04-08 NOTE — ED PROVIDER NOTE - CLINICAL SUMMARY MEDICAL DECISION MAKING FREE TEXT BOX
This is a 82 year old female with pmh of a-fib on eliquis and ASA, HTN, HLD presenting with weakness/dizziness since 10pm associated with heaviness of L arm, bilateral headache. Still feeling lightheaded. Vitals wnl. Neuro exam shows mild L facial muscle weakness. Otherwise unremarkable. Code stroke called. Dispo pending CT result and lab result. Will await for neuro rec.

## 2023-04-08 NOTE — ED PROVIDER NOTE - ATTENDING CONTRIBUTION TO CARE
I have personally performed a face to face medical and diagnostic evaluation of the patient. I have discussed with and reviewed the Resident's note and agree with the History, ROS, Physical Exam and MDM unless otherwise indicated. A brief summary of my personal evaluation and impression can be found below.    Robin RICH: 82-year-old female history of A-fib on AC ASA hypertension hyperlipidemia, presents the chief complaint of weakness, as well as sensation of feeling as if her head is in a cloud had some nausea no vomiting can move everything and feel everything no chest pain no shortness of breath no abdominal pain no urinary or bowel complaints, noted triage and resident note however on clarification patient denies dizziness as a vertigo sensation but reports that she feels lightheaded.  Patient reports she is going through multiple stressors at home related to a family pet as well as in-laws.  No vision changes no word finding difficulty can move everything and feel everything, no urinary or bowel complaints, code stroke was called in triage.    All other ROS negative, except as above and as per HPI and ROS section.    VITALS: Initial triage and subsequent vitals have been reviewed by me.  GEN APPEARANCE: Alert, non-toxic, well-appearing, NAD.  HEAD: Atraumatic.  EYES: PERRLa, EOMI, vision grossly intact.   NECK: Supple  CV: RRR, S1S2, no c/r/m/g. Cap refill <2 seconds. No bruits.   LUNGS: CTAB. No abnormal breath sounds.  ABDOMEN: Soft, NTND. No guarding or rebound.   MSK/EXT: No spinal or extremity point tenderness. No CVA ttp. Pelvis stable. No peripheral edema.  NEURO: possible mild L facial weakness. Alert, follows commands. Weight bearing normal. Speech normal. Sensation and motor normal x4 extremities. CN2-12 normal, coordination normal, ambulating normally. UE & LE 5/5 b/l.  SKIN: Warm, dry and intact. No rash.  PSYCH: Appropriate    Plan/MDM: Exam vital signs stable nontoxic-appearing physical exam as above, DDx concern for possible CVA versus TIA, does not appear primary cardiac etiology in nature, episode may also be related to stress from social factors at home, low concern for acute infectious etiology as no fever, aside from some mild left-sided facial weakness her exam is grossly reassuring, will be ED code stroke order set, discussed with neuro, EKG labs cardiac enzymes meds as needed and reassess and dispo accordingly thereafter in consultation with neurology.

## 2023-04-08 NOTE — H&P ADULT - HISTORY OF PRESENT ILLNESS
82 year old female with mhx of a-fib on Eliquis and ASA, HTN, HLD c/o weakness and dizziness since 10pm with associated heaviness of L arm and headache. Reports no photophobia or neck stiffness and no fever/chills, vision changes, word finding difficulty/slurred speech, chest pain, sob, abdominal pain, or urinary sxs.        ED course: Code stroke called. Neurology c/s; NIHSS: 2; preMRS: 0   82 year old female with mhx of A-fib on Eliquis and ASA, HTN, HLD c/o weakness and dizziness since 10pm with associated heaviness of L arm and headache. Reports no photophobia or neck stiffness and no fever/chills, vision changes, word finding difficulty/slurred speech, chest pain, sob, abdominal pain, or urinary sxs.        ED course: Code stroke called. Neurology c/s; NIHSS: 2; preMRS: 0; Passed dysphagia screening;   82 year old female with MHx of A-fib on Eliquis and ASA, HTN, HLD c/o dizziness and gait instability since 10pm with associated heaviness of L arm and headache. Reports no photophobia or neck stiffness and no fever/chills, vision changes, word finding difficulty/slurred speech, chest pain, sob, abdominal pain, or urinary sxs. Reports compliance with medications;        ED course: Code stroke called. Neurology c/s; NIHSS: 2; preMRS: 0; Passed dysphagia screening;

## 2023-04-08 NOTE — ED ADULT NURSE NOTE - OBJECTIVE STATEMENT
Allegra RN: Pt A&Ox4, ambulatory. Respirations equal and unlabored. Pt c/o dizziness and left arm "heaviness" starting at 10pm. Pt states she was taking her night time medications and started to feel like the room was spinning. Code stroke activated. PMH HTN, a fib on Eliquis. Pt denies CP, SOB, palpitations, dizziness, blurry vision, headache, numbness, tingling, any other complaints. Pt noted to have a slight left sided facial droop. PERRLA. Equal strength and sensation in upper and lower bilateral extremities. Abdomen soft, nontender, nondistended. Skin dry and intact. 20G IV place to [facility]. Labs drawn and sent. CT's currently in progress. No acute distress noted. Safety maintained.

## 2023-04-08 NOTE — OCCUPATIONAL THERAPY INITIAL EVALUATION ADULT - DIAGNOSIS, OT EVAL
s/p dizziness, s/p left UE heaviness, s/p r/o acute CVA; decreased functional mobility, decreased ADL performance

## 2023-04-08 NOTE — H&P ADULT - PROBLEM SELECTOR PLAN 2
EKG: c/w NSR and chronic RBBB Acute; r/o cerebellar stoke   MRI brain  Meclizine prn (first dose relieved the symptoms)  fall precautions   PT

## 2023-04-08 NOTE — ED PROVIDER NOTE - OBJECTIVE STATEMENT
This is a 82 year old female with pmh of a-fib on eliquis, HTN, HLD presenting with weakness/dizziness since 10pm associated with heavy feeling of her tongue. This is a 82 year old female with pmh of a-fib on eliquis and ASA, HTN, HLD presenting with weakness/dizziness since 10pm associated with heaviness of L arm, bilateral headache. Denies any fever/chills, vision changes, word finding difficulty/slurred speech, chest pain, sob, abdominal pain, or urinary sxs. Code stroke called.

## 2023-04-08 NOTE — CONSULT NOTE ADULT - ATTENDING COMMENTS
Ms. Mclaughlin is a 82 year old right handed woman with PMHx of A-fib on anticoagulation & aspirin, migraine and asa vascular risk factors inclduing HTN, HLD, TIA, who was admitted due to headache, dizziness, left side weakness as well as difficulty in walking. During my assessment, she had nystagmus at all direction horizontal and vertical gaze bilaterally, very subtle mild left hemiparesis, diminished reflexes at left side and unsteady gait with broad based walk. Etiology of above symptoms and finding most likely due to the acute ischemic stroke involving right side brain stem versus other possible etiologies. Patient will benefit from further work up to rule out treatable etiologies. MRI brain, and other stroke work up. Currently patient on  A-fib on anticoagulation & aspirin therefore, she is risk for bleed both intracranial as well as extracranial. From neurology point of view patient will benefit from only anticoagulation if there is no contraindication.  I discussed the diagnosis, treatment plan and prognosis with the patient and family. Risk benefit and alternatives to the treatment were discussed. All questions and concerns were addressed. The patient demonstrated good understanding of the treatment plan.  If you have any further questions, please do not hesitate to contact our office.  Thank you for allowing me to participate in this patient care.    Delgado Granger MD

## 2023-04-08 NOTE — H&P ADULT - NSHPPHYSICALEXAM_GEN_ALL_CORE
Vital Signs Last 24 Hrs  T(C): 36.5 (08 Apr 2023 05:14), Max: 36.7 (07 Apr 2023 23:36)  T(F): 97.7 (08 Apr 2023 05:14), Max: 98 (07 Apr 2023 23:36)  HR: 66 (08 Apr 2023 05:14) (66 - 85)  BP: 130/90 (08 Apr 2023 05:14) (130/90 - 159/94)  BP(mean): --  RR: 16 (08 Apr 2023 05:14) (16 - 18)  SpO2: 100% (08 Apr 2023 05:14) (100% - 100%)    Parameters below as of 08 Apr 2023 05:14  Patient On (Oxygen Delivery Method): room air

## 2023-04-08 NOTE — PATIENT PROFILE ADULT - FUNCTIONAL ASSESSMENT - BASIC MOBILITY 6.
4-calculated by average/Not able to assess (calculate score using [facility] averaging method)

## 2023-04-08 NOTE — OCCUPATIONAL THERAPY INITIAL EVALUATION ADULT - LIVES WITH, PROFILE
Patient lives in a private home on the 2nd floor with her  and daughter (son lives downstairs) with 4 steps to enter + 13 steps to 2nd floor.

## 2023-04-09 ENCOUNTER — TRANSCRIPTION ENCOUNTER (OUTPATIENT)
Age: 83
End: 2023-04-09

## 2023-04-09 LAB
ANION GAP SERPL CALC-SCNC: 12 MMOL/L — SIGNIFICANT CHANGE UP (ref 7–14)
BUN SERPL-MCNC: 21 MG/DL — SIGNIFICANT CHANGE UP (ref 7–23)
CALCIUM SERPL-MCNC: 9.8 MG/DL — SIGNIFICANT CHANGE UP (ref 8.4–10.5)
CHLORIDE SERPL-SCNC: 107 MMOL/L — SIGNIFICANT CHANGE UP (ref 98–107)
CO2 SERPL-SCNC: 24 MMOL/L — SIGNIFICANT CHANGE UP (ref 22–31)
CREAT SERPL-MCNC: 0.97 MG/DL — SIGNIFICANT CHANGE UP (ref 0.5–1.3)
EGFR: 58 ML/MIN/1.73M2 — LOW
GLUCOSE SERPL-MCNC: 92 MG/DL — SIGNIFICANT CHANGE UP (ref 70–99)
HCT VFR BLD CALC: 38.1 % — SIGNIFICANT CHANGE UP (ref 34.5–45)
HGB BLD-MCNC: 12.2 G/DL — SIGNIFICANT CHANGE UP (ref 11.5–15.5)
MAGNESIUM SERPL-MCNC: 1.9 MG/DL — SIGNIFICANT CHANGE UP (ref 1.6–2.6)
MCHC RBC-ENTMCNC: 29.4 PG — SIGNIFICANT CHANGE UP (ref 27–34)
MCHC RBC-ENTMCNC: 32 GM/DL — SIGNIFICANT CHANGE UP (ref 32–36)
MCV RBC AUTO: 91.8 FL — SIGNIFICANT CHANGE UP (ref 80–100)
NRBC # BLD: 0 /100 WBCS — SIGNIFICANT CHANGE UP (ref 0–0)
NRBC # FLD: 0 K/UL — SIGNIFICANT CHANGE UP (ref 0–0)
PHOSPHATE SERPL-MCNC: 4.5 MG/DL — SIGNIFICANT CHANGE UP (ref 2.5–4.5)
PLATELET # BLD AUTO: 227 K/UL — SIGNIFICANT CHANGE UP (ref 150–400)
POTASSIUM SERPL-MCNC: 4.4 MMOL/L — SIGNIFICANT CHANGE UP (ref 3.5–5.3)
POTASSIUM SERPL-SCNC: 4.4 MMOL/L — SIGNIFICANT CHANGE UP (ref 3.5–5.3)
RBC # BLD: 4.15 M/UL — SIGNIFICANT CHANGE UP (ref 3.8–5.2)
RBC # FLD: 13.2 % — SIGNIFICANT CHANGE UP (ref 10.3–14.5)
SODIUM SERPL-SCNC: 143 MMOL/L — SIGNIFICANT CHANGE UP (ref 135–145)
WBC # BLD: 6.26 K/UL — SIGNIFICANT CHANGE UP (ref 3.8–10.5)
WBC # FLD AUTO: 6.26 K/UL — SIGNIFICANT CHANGE UP (ref 3.8–10.5)

## 2023-04-09 PROCEDURE — 99232 SBSQ HOSP IP/OBS MODERATE 35: CPT

## 2023-04-09 PROCEDURE — 93306 TTE W/DOPPLER COMPLETE: CPT | Mod: 26

## 2023-04-09 PROCEDURE — 99233 SBSQ HOSP IP/OBS HIGH 50: CPT | Mod: GC

## 2023-04-09 RX ORDER — MECLIZINE HCL 12.5 MG
25 TABLET ORAL THREE TIMES A DAY
Refills: 0 | Status: DISCONTINUED | OUTPATIENT
Start: 2023-04-09 | End: 2023-04-10

## 2023-04-09 RX ORDER — ACETAMINOPHEN 500 MG
650 TABLET ORAL EVERY 6 HOURS
Refills: 0 | Status: DISCONTINUED | OUTPATIENT
Start: 2023-04-09 | End: 2023-04-10

## 2023-04-09 RX ADMIN — APIXABAN 5 MILLIGRAM(S): 2.5 TABLET, FILM COATED ORAL at 18:37

## 2023-04-09 RX ADMIN — ATORVASTATIN CALCIUM 80 MILLIGRAM(S): 80 TABLET, FILM COATED ORAL at 21:42

## 2023-04-09 RX ADMIN — Medication 25 MILLIGRAM(S): at 11:41

## 2023-04-09 RX ADMIN — Medication 12.5 MILLIGRAM(S): at 18:37

## 2023-04-09 RX ADMIN — Medication 1 MILLIGRAM(S): at 11:41

## 2023-04-09 RX ADMIN — Medication 650 MILLIGRAM(S): at 13:00

## 2023-04-09 RX ADMIN — Medication 25 MILLIGRAM(S): at 21:42

## 2023-04-09 RX ADMIN — Medication 650 MILLIGRAM(S): at 12:00

## 2023-04-09 RX ADMIN — Medication 12.5 MILLIGRAM(S): at 06:13

## 2023-04-09 RX ADMIN — PANTOPRAZOLE SODIUM 40 MILLIGRAM(S): 20 TABLET, DELAYED RELEASE ORAL at 06:13

## 2023-04-09 RX ADMIN — APIXABAN 5 MILLIGRAM(S): 2.5 TABLET, FILM COATED ORAL at 06:12

## 2023-04-09 NOTE — DISCHARGE NOTE PROVIDER - NSDCCPCAREPLAN_GEN_ALL_CORE_FT
PRINCIPAL DISCHARGE DIAGNOSIS  Diagnosis: Weakness  Assessment and Plan of Treatment:      PRINCIPAL DISCHARGE DIAGNOSIS  Diagnosis: Weakness  Assessment and Plan of Treatment: You presented with left arm heaviness. Your CT head was negative. Your MRI brain did not show any acute pathology. Your echocardiogram was normal. You are to continue Apixaban. You do not need to take aspirin. We are discharging you on  Meclizine for dizziness and Tylenol for headache. Outpatient follow up with stroke neurologist at [address] [place] [address] [phone].

## 2023-04-09 NOTE — PROGRESS NOTE ADULT - ASSESSMENT
Ms. Mclaughlin is a 82 year old right handed woman with PMHx of A-fib on anticoagulation & aspirin, migraine and asa vascular risk factors inclduing HTN, HLD, TIA, who was admitted due to headache, dizziness, left side weakness as well as difficulty in walking. During my assessment, left side hemiparesis is resolved but still she had horizontal nystagmus bilaterally. MRI brain did not showed any acute pathology.  During admission on anticoagulation & aspirin therefore in the setting of A-Fib, she is risk for bleed both intracranial as well as extracranial. From neurology point of view patient will benefit from only anticoagulation if there is no contraindication. Continue Meclizine for dizziness and Tylenol for PRN headache. For dizziness she would benefit from vestibular rehab and ENT evaluation. Out patient follow up with stroke neurologist at [address] [place] [address] [phone]. There is no further neurology work up needed. Plan of care discussed with neurology resident.   I discussed the diagnosis, treatment plan and prognosis with the patient and family. Risk benefit and alternatives to the treatment were discussed. All questions and concerns were addressed. The patient demonstrated good understanding of the treatment plan.  If you have any further questions, please do not hesitate to contact our office.  Thank you for allowing me to participate in this patient care.    Delgado Granger MD .
82 year old female with mhx of A-fib on Eliquis and ASA, HTN, HLD a/w likely acute stroke          
82 year old female with mhx of A-fib on Eliquis and ASA, HTN, HLD a/w likely acute stroke , was ruled out.          
Yes

## 2023-04-09 NOTE — DISCHARGE NOTE PROVIDER - HOSPITAL COURSE
82 year old female with PMHx of A-fib on Eliquis and ASA, HTN and HLD who presents with acute vertigo and L arm heaviness/ weakness who is admitted to r/o CVA. CT and MR imaging unremarkable and without acute stroke. TTE ___________. Started on meclizine for dizziness. PT recommends outpatient PT.               82 year old female with pmhx of A-fib on Eliquis and ASA, HTN, HLD admitted with left arm heaviness. CTH neg. MRI brain did not show any acute pathology. To continue Apixaban - no role for aspirin per neurology given increaesd risk for bleed both intracranially as well as extracranially. Continue Meclizine for dizziness and Tylenol for PRN headache. For dizziness she would benefit from vestibular rehab and ENT evaluation. Out patient follow up with stroke neurologist at [address] [place] [address] [phone]. There is no further neurology work up needed. Continue BB and AC for afib hx. Orthostatic neg. Echo grossly normal. Outpatient PT.   Patient seen and evaluated, no acute somatic complaints. Reviewed discharge medications with patient; All new medications requiring new prescriptions were sent to the pharmacy of patient's choice. Reviewed need for prescription for previous home medications and new prescriptions sent if requested. Medically cleared/stable for discharge as per Dr. Musa with close outpatient follow up within 1-2 weeks of discharge. Patient understands and agrees with plan of care.

## 2023-04-09 NOTE — DISCHARGE NOTE PROVIDER - NSDCFUADDAPPT_GEN_ALL_CORE_FT
Follow up with PCP within 1-2 weeks of discharge. If you are in need of a general medicine physician and post-discharge medical follow-up for further care/recommendations you may contact the [facility] for an appointment at [phone].     Follow up with neurology within 1-2 weeks of discharge. You may follow up at [address] [place] [id number]. Please call [phone] for an appointment.

## 2023-04-09 NOTE — DISCHARGE NOTE PROVIDER - CARE PROVIDER_API CALL
Carlos Manuel Overton)  Neurology; Vascular Neurology  [address] [facility] [address]  [place] [address]  Phone: [phone]  Fax: [phone]  Follow Up Time:

## 2023-04-09 NOTE — PROGRESS NOTE ADULT - SUBJECTIVE AND OBJECTIVE BOX
Patient was seen and examined at bed. No new overnight events were reported. Patient daughter was present. I appreciate that. Patient confirmed that she is feeling much better, very mild headache still there otherwise, no new neurology symptoms were reported.  Neurology Progress Note    SUBJECTIVE/OBJECTIVE/INTERVAL EVENTS: Patient seen and examined at bedside w/ neuro attending and team.     INTERVAL HISTORY:    REVIEW OF SYSTEMS: Otherwise denies fever, chills, headaches, vision changes, blurry vision, double vision, nausea, vomiting, hearing change, focal weakness, focal numbness, parasthesias, bowel/ bladder incontinence.  Few questions of a 10-system ROS was performed and is negative except for those items noted above and/or in the HPI.    VITALS & EXAMINATION:  Vital Signs Last 24 Hrs  T(C): 36.8 (09 Apr 2023 18:56), Max: 36.8 (09 Apr 2023 18:56)  T(F): 98.3 (09 Apr 2023 18:56), Max: 98.3 (09 Apr 2023 18:56)  HR: 84 (09 Apr 2023 18:56) (67 - 84)  BP: 123/78 (09 Apr 2023 18:56) (113/72 - 129/78)  BP(mean): --  RR: 18 (09 Apr 2023 18:56) (17 - 18)  SpO2: 97% (09 Apr 2023 18:56) (97% - 98%)    Parameters below as of 09 Apr 2023 18:56  Patient On (Oxygen Delivery Method): room air     Detailed neuro exam:    General: Patient is comfortably lying on the bed without acute distress.    Mental and Psychological Status: The patient is alert and oriented to person, place and exact date. Follows simple and complex commands. Speech is fluent, comprehension, naming and repetition are intact. Relates personal medical history clearly and with detail.    Cranial Nerve Exam: Visual fields are full to confrontation. Pupils are round, equal, and reactive. Extraocular movements are full but still she had horizontal nystagmus bilaterally. V1-V3 are intact to light touch. There is ? very subtle facial weakness or asymmetry. Hearing is grossly intact. Palate elevation is symmetric. Shoulder shrug is 5/5 bilaterally. Tongue protrusion is midline. There is no dysarthria.    Motor Exam: Bulk, tone, and strength are normal. There is no pronator drift. There are no resting tremors.    Sensory Examination: Sensation is intact to light touch throughout.    Deep Tendon Reflexes and Plantar Responses: Deferred.    Posture, Station and Gait: She was able to stand up independently without any difficulty ( no dizziness was observed ) and gait is broad based.     Coordination: There is no dysmetria or ataxia with finger-nose-finger or heel-knee-shin. No intention tremors are present.                                                                                                                                                                                                           LABORATORY:  CBC                       12.2   6.26  )-----------( 227      ( 09 Apr 2023 05:55 )             38.1     Chem 04-09    143  |  107  |  21  ----------------------------<  92  4.4   |  24  |  0.97    Ca    9.8      09 Apr 2023 05:55  Phos  4.5     04-09  Mg     1.90     04-09    TPro  6.7  /  Alb  4.5  /  TBili  0.3  /  DBili  x   /  AST  29  /  ALT  23  /  AlkPhos  80  04-08    LFTs LIVER FUNCTIONS - ( 08 Apr 2023 00:26 )  Alb: 4.5 g/dL / Pro: 6.7 g/dL / ALK PHOS: 80 U/L / ALT: 23 U/L / AST: 29 U/L / GGT: x           Coagulopathy PT/INR - ( 08 Apr 2023 00:26 )   PT: 17.6 sec;   INR: 1.51 ratio         PTT - ( 08 Apr 2023 00:26 )  PTT:34.5 sec  Lipid Panel 04-08 Chol 105 LDL -- HDL 40<L> Trig 54  A1c   Cardiac enzymes     U/A   CSF  Immunological  Other    STUDIES & IMAGING: (EEG, CT, MR, U/S, TTE/CARLA):
Medicine Progress Note    Patient is a 82y old  Female who presents with a chief complaint of L arm weakness and L facial "tingling" (09 Apr 2023 09:45)      SUBJECTIVE / OVERNIGHT EVENTS: MRI- no CVA  PT- outpatient  PT   TTE- Mild diastolic dysfunction (Stage I).  Still vertigo , requests to stay in the hospital         MEDICATIONS  (STANDING):  apixaban 5 milliGRAM(s) Oral every 12 hours  atorvastatin 80 milliGRAM(s) Oral at bedtime  folic acid 1 milliGRAM(s) Oral daily  meclizine 25 milliGRAM(s) Oral three times a day  metoprolol tartrate 12.5 milliGRAM(s) Oral every 12 hours  pantoprazole    Tablet 40 milliGRAM(s) Oral before breakfast    MEDICATIONS  (PRN):  acetaminophen     Tablet .. 650 milliGRAM(s) Oral every 6 hours PRN Temp greater or equal to 38C (100.4F), Mild Pain (1 - 3), Moderate Pain (4 - 6), Severe Pain (7 - 10)  meclizine 25 milliGRAM(s) Oral every 8 hours PRN Dizziness    CAPILLARY BLOOD GLUCOSE        I&O's Summary      PHYSICAL EXAM:  Vital Signs Last 24 Hrs  T(C): 36.5 (09 Apr 2023 08:25), Max: 36.8 (08 Apr 2023 18:50)  T(F): 97.7 (09 Apr 2023 08:25), Max: 98.2 (08 Apr 2023 18:50)  HR: 72 (09 Apr 2023 08:25) (67 - 94)  BP: 127/76 (09 Apr 2023 08:25) (113/72 - 131/84)  BP(mean): --  RR: 17 (09 Apr 2023 08:25) (17 - 18)  SpO2: 97% (09 Apr 2023 08:25) (97% - 99%)    Parameters below as of 09 Apr 2023 08:25  Patient On (Oxygen Delivery Method): room air      CONSTITUTIONAL: NAD,   ENMT: Moist oral mucosa, no pharyngeal injection or exudates;  RESPIRATORY: Normal respiratory effort; lungs are clear to auscultation bilaterally  CARDIOVASCULAR: Regular rate and rhythm, normal S1 and S2, No lower extremity edema;   ABDOMEN: Nontender to palpation, normoactive bowel sounds, no rebound/guarding;   PSYCH: A+O to person, place, and time; affect appropriate  NEUROLOGY: CN 2-12 are intact and symmetric; no gross sensory deficits   SKIN: No rashes; no palpable lesions    LABS:                        12.2   6.26  )-----------( 227      ( 09 Apr 2023 05:55 )             38.1     04-09    143  |  107  |  21  ----------------------------<  92  4.4   |  24  |  0.97    Ca    9.8      09 Apr 2023 05:55  Phos  4.5     04-09  Mg     1.90     04-09    TPro  6.7  /  Alb  4.5  /  TBili  0.3  /  DBili  x   /  AST  29  /  ALT  23  /  AlkPhos  80  04-08    PT/INR - ( 08 Apr 2023 00:26 )   PT: 17.6 sec;   INR: 1.51 ratio         PTT - ( 08 Apr 2023 00:26 )  PTT:34.5 sec              RADIOLOGY & ADDITIONAL TESTS:  Imaging from Last 24 Hours:    Electrocardiogram/QTc Interval:    COORDINATION OF CARE:  Care Discussed with Consultants/Other Providers: ACP   
Medicine Progress Note    Patient is a 82y old  Female who presents with a chief complaint of L arm weakness and L facial "tingling" (08 Apr 2023 06:21)      SUBJECTIVE / OVERNIGHT EVENTS: patient symptoms resolved , MRI pending         MEDICATIONS  (STANDING):  apixaban 5 milliGRAM(s) Oral every 12 hours  atorvastatin 80 milliGRAM(s) Oral at bedtime  folic acid 1 milliGRAM(s) Oral daily  metoprolol tartrate 12.5 milliGRAM(s) Oral every 12 hours  pantoprazole    Tablet 40 milliGRAM(s) Oral before breakfast    MEDICATIONS  (PRN):  meclizine 25 milliGRAM(s) Oral every 8 hours PRN Dizziness    CAPILLARY BLOOD GLUCOSE      POCT Blood Glucose.: 115 mg/dL (07 Apr 2023 23:40)    I&O's Summary      PHYSICAL EXAM:  Vital Signs Last 24 Hrs  T(C): 36.6 (08 Apr 2023 09:20), Max: 36.7 (07 Apr 2023 23:36)  T(F): 97.8 (08 Apr 2023 09:20), Max: 98 (07 Apr 2023 23:36)  HR: 94 (08 Apr 2023 13:26) (66 - 94)  BP: 131/84 (08 Apr 2023 13:26) (127/79 - 159/94)  BP(mean): --  RR: 18 (08 Apr 2023 13:26) (16 - 18)  SpO2: 99% (08 Apr 2023 13:26) (99% - 100%)    Parameters below as of 08 Apr 2023 13:26  Patient On (Oxygen Delivery Method): room air      CONSTITUTIONAL: NAD,   ENMT: Moist oral mucosa, no pharyngeal injection or exudates; normal dentition  RESPIRATORY: Normal respiratory effort; lungs are clear to auscultation bilaterally  CARDIOVASCULAR: Regular rate and rhythm, normal S1 and S2, No lower extremity edema;   ABDOMEN: Nontender to palpation, normoactive bowel sounds, no rebound/guarding;  PSYCH: A+O to person, place, and time; affect appropriate  NEUROLOGY: CN 2-12 are intact and symmetric; no gross sensory deficits   SKIN: No rashes; no palpable lesions    LABS:                        12.7   6.33  )-----------( 246      ( 08 Apr 2023 00:26 )             39.1     04-08    141  |  105  |  20  ----------------------------<  130<H>  4.2   |  23  |  0.91    Ca    9.8      08 Apr 2023 00:26    TPro  6.7  /  Alb  4.5  /  TBili  0.3  /  DBili  x   /  AST  29  /  ALT  23  /  AlkPhos  80  04-08    PT/INR - ( 08 Apr 2023 00:26 )   PT: 17.6 sec;   INR: 1.51 ratio         PTT - ( 08 Apr 2023 00:26 )  PTT:34.5 sec              RADIOLOGY & ADDITIONAL TESTS:  Imaging from Last 24 Hours:    Electrocardiogram/QTc Interval:    COORDINATION OF CARE:  Care Discussed with Consultants/Other Providers: ACP

## 2023-04-09 NOTE — PROGRESS NOTE ADULT - PROBLEM SELECTOR PLAN 2
Acute; r/o cerebellar stoke , ruled out   MRI brain- No MRI evidence of acute intracranial pathology.  Mild generalized cerebral volume loss.  Mild to moderate patchy chronic microvascular ischemic disease.  Meclizine prn (first dose relieved the symptoms), continue   fall precautions   vestibular rehab outpatient
Acute; r/o cerebellar stoke   MRI brain  Meclizine prn (first dose relieved the symptoms)  fall precautions   PT

## 2023-04-09 NOTE — PROGRESS NOTE ADULT - PROBLEM SELECTOR PLAN 1
Acute vertigo, L arm heaviness/ weakness; Symptoms in resolution; CVA vs TIA; Passed dysphagia screen;   c/w Eliquis, stop  Aspirin   c/w Atorvastatin 80 mg   HgbA1C, fasting lipid panel added on   MRI brain  no CVA  TTE  - diastolic disfunction   Telemetry- no events    Stroke education and counseling  Neuro-checks and VS q4h  Dysphagia screen: passed  Aspiration, fall precautions  PT outpatient PT /OT   Will start Meclizine as pos peripheral vertigo, BPV
Acute vertigo, L arm heaviness/ weakness; Symptoms in resolution; CVA vs TIA; Passed dysphagia screen;   c/w Eliquis, stop  Aspirin   c/w Atorvastatin 80 mg   HgbA1C, fasting lipid panel added on   MRI brain w/o con inpatient  TTE  follow up   Telemetry   Stroke education and counseling  Neuro-checks and VS q4h  Permissive HTN up to 220/120 for 24-48h   Dysphagia screen: passed  Aspiration, fall precautions

## 2023-04-09 NOTE — PROGRESS NOTE ADULT - REASON FOR ADMISSION
L arm weakness and L facial "tingling"

## 2023-04-09 NOTE — DISCHARGE NOTE PROVIDER - NSDCMRMEDTOKEN_GEN_ALL_CORE_FT
aspirin 81 mg oral delayed release tablet: 1 orally once a day  ATORVASTATIN 80 MG TABLET:   DEXLANSOPRAZOLE 60MG DR CAPSULES: TAKE 1 CAPSULE BY MOUTH EVERY MORNING BEFORE BREAKFAST  ELIQUIS 5 MG TABLET:   FENOFIBRIC 135MG DR CAPSULES: TAKE 1 CAPSULE BY MOUTH DAILY  FOLIC ACID 1 MG TABLET:   METOPROLOL SUCC ER 25 MG TAB:   OLMESARTAN MEDOXOMIL 20 MG TAB:    apixaban 5 mg oral tablet: 1 tab(s) orally every 12 hours  ATORVASTATIN 80 MG TABLET:   DEXLANSOPRAZOLE 60MG DR CAPSULES: TAKE 1 CAPSULE BY MOUTH EVERY MORNING BEFORE BREAKFAST  FENOFIBRIC 135MG DR CAPSULES: TAKE 1 CAPSULE BY MOUTH DAILY  FOLIC ACID 1 MG TABLET:   meclizine 25 mg oral tablet: 1 tab(s) orally 3 times a day as needed for  dizziness  METOPROLOL SUCC ER 25 MG TAB:   OLMESARTAN MEDOXOMIL 20 MG TAB:

## 2023-04-09 NOTE — DISCHARGE NOTE PROVIDER - NSDCFUSCHEDAPPT_GEN_ALL_CORE_FT
Oscar James  [facility]  [address]UL[phone]  Scheduled Appointment: 04/18/2023    [facility] [phone]  Scheduled Appointment: 06/27/2023    Oscar James  [facility] [phone]  Scheduled Appointment: 06/27/2023     Oscar James  [facility]  [phone]  Scheduled Appointment: 04/11/2023    Jacob Oscar  [facility]  [phone]  Scheduled Appointment: 04/18/2023    [facility]  [phone]  Scheduled Appointment: 06/27/2023    Jacob Oscar  [facility]  [phone]   Scheduled Appointment: 06/27/2023

## 2023-04-09 NOTE — PROGRESS NOTE ADULT - PROBLEM SELECTOR PLAN 3
EKG: c/w NSR and chronic RBBB  [id number] risk stratification score 6  c/w Eliquis   TSH  added on   Reduced dose of Metoprolol due to permissive HTN for now
EKG: c/w NSR and chronic RBBB  [id number] risk stratification score 6  c/w Eliquis   TSH  added on   c/w  Metoprolol

## 2023-04-10 ENCOUNTER — NON-APPOINTMENT (OUTPATIENT)
Age: 83
End: 2023-04-10

## 2023-04-10 ENCOUNTER — TRANSCRIPTION ENCOUNTER (OUTPATIENT)
Age: 83
End: 2023-04-10

## 2023-04-10 VITALS
DIASTOLIC BLOOD PRESSURE: 76 MMHG | SYSTOLIC BLOOD PRESSURE: 124 MMHG | TEMPERATURE: 97 F | OXYGEN SATURATION: 100 % | HEART RATE: 92 BPM | RESPIRATION RATE: 18 BRPM

## 2023-04-10 LAB
ANION GAP SERPL CALC-SCNC: 14 MMOL/L — SIGNIFICANT CHANGE UP (ref 7–14)
BUN SERPL-MCNC: 18 MG/DL — SIGNIFICANT CHANGE UP (ref 7–23)
CALCIUM SERPL-MCNC: 9.9 MG/DL — SIGNIFICANT CHANGE UP (ref 8.4–10.5)
CHLORIDE SERPL-SCNC: 105 MMOL/L — SIGNIFICANT CHANGE UP (ref 98–107)
CO2 SERPL-SCNC: 23 MMOL/L — SIGNIFICANT CHANGE UP (ref 22–31)
CREAT SERPL-MCNC: 0.83 MG/DL — SIGNIFICANT CHANGE UP (ref 0.5–1.3)
EGFR: 70 ML/MIN/1.73M2 — SIGNIFICANT CHANGE UP
GLUCOSE SERPL-MCNC: 96 MG/DL — SIGNIFICANT CHANGE UP (ref 70–99)
HCT VFR BLD CALC: 40.2 % — SIGNIFICANT CHANGE UP (ref 34.5–45)
HGB BLD-MCNC: 13.1 G/DL — SIGNIFICANT CHANGE UP (ref 11.5–15.5)
MAGNESIUM SERPL-MCNC: 2 MG/DL — SIGNIFICANT CHANGE UP (ref 1.6–2.6)
MCHC RBC-ENTMCNC: 29.8 PG — SIGNIFICANT CHANGE UP (ref 27–34)
MCHC RBC-ENTMCNC: 32.6 GM/DL — SIGNIFICANT CHANGE UP (ref 32–36)
MCV RBC AUTO: 91.6 FL — SIGNIFICANT CHANGE UP (ref 80–100)
NRBC # BLD: 0 /100 WBCS — SIGNIFICANT CHANGE UP (ref 0–0)
NRBC # FLD: 0 K/UL — SIGNIFICANT CHANGE UP (ref 0–0)
PHOSPHATE SERPL-MCNC: 4 MG/DL — SIGNIFICANT CHANGE UP (ref 2.5–4.5)
PLATELET # BLD AUTO: 242 K/UL — SIGNIFICANT CHANGE UP (ref 150–400)
POTASSIUM SERPL-MCNC: 4.1 MMOL/L — SIGNIFICANT CHANGE UP (ref 3.5–5.3)
POTASSIUM SERPL-SCNC: 4.1 MMOL/L — SIGNIFICANT CHANGE UP (ref 3.5–5.3)
RBC # BLD: 4.39 M/UL — SIGNIFICANT CHANGE UP (ref 3.8–5.2)
RBC # FLD: 13.2 % — SIGNIFICANT CHANGE UP (ref 10.3–14.5)
SODIUM SERPL-SCNC: 142 MMOL/L — SIGNIFICANT CHANGE UP (ref 135–145)
WBC # BLD: 6.31 K/UL — SIGNIFICANT CHANGE UP (ref 3.8–10.5)
WBC # FLD AUTO: 6.31 K/UL — SIGNIFICANT CHANGE UP (ref 3.8–10.5)

## 2023-04-10 PROCEDURE — 99222 1ST HOSP IP/OBS MODERATE 55: CPT

## 2023-04-10 RX ORDER — APIXABAN 2.5 MG/1
1 TABLET, FILM COATED ORAL
Qty: 0 | Refills: 0 | DISCHARGE
Start: 2023-04-10

## 2023-04-10 RX ORDER — MECLIZINE HCL 12.5 MG
1 TABLET ORAL
Refills: 0
Start: 2023-04-10

## 2023-04-10 RX ORDER — MECLIZINE HCL 12.5 MG
1 TABLET ORAL
Qty: 90 | Refills: 0
Start: 2023-04-10 | End: 2023-05-09

## 2023-04-10 RX ORDER — APIXABAN 2.5 MG/1
0 TABLET, FILM COATED ORAL
Qty: 0 | Refills: 0 | DISCHARGE

## 2023-04-10 RX ORDER — ASPIRIN/CALCIUM CARB/MAGNESIUM 324 MG
1 TABLET ORAL
Refills: 0 | DISCHARGE

## 2023-04-10 RX ADMIN — Medication 25 MILLIGRAM(S): at 13:10

## 2023-04-10 RX ADMIN — Medication 12.5 MILLIGRAM(S): at 04:53

## 2023-04-10 RX ADMIN — Medication 25 MILLIGRAM(S): at 04:52

## 2023-04-10 RX ADMIN — APIXABAN 5 MILLIGRAM(S): 2.5 TABLET, FILM COATED ORAL at 17:24

## 2023-04-10 RX ADMIN — APIXABAN 5 MILLIGRAM(S): 2.5 TABLET, FILM COATED ORAL at 04:53

## 2023-04-10 RX ADMIN — Medication 1 MILLIGRAM(S): at 13:11

## 2023-04-10 RX ADMIN — PANTOPRAZOLE SODIUM 40 MILLIGRAM(S): 20 TABLET, DELAYED RELEASE ORAL at 04:53

## 2023-04-10 NOTE — DISCHARGE NOTE NURSING/CASE MANAGEMENT/SOCIAL WORK - PATIENT PORTAL LINK FT
You can access the FollowMyHealth Patient Portal offered by [facility] by registering at the following website: [email or web address]. By joining [facility]’s FollowMyHealth portal, you will also be able to view your health information using other applications (apps) compatible with our system.

## 2023-04-10 NOTE — DISCHARGE NOTE NURSING/CASE MANAGEMENT/SOCIAL WORK - NSDCFUADDAPPT_GEN_ALL_CORE_FT
Follow up with PCP within 1-2 weeks of discharge. If you are in need of a general medicine physician and post-discharge medical follow-up for further care/recommendations you may contact the [facility] for an appointment at [phone].     Follow up with neurology within 1-2 weeks of discharge. You may follow up at [address] [place] [id number]. Please call [phone] for an appointment.

## 2023-04-10 NOTE — CONSULT NOTE ADULT - SUBJECTIVE AND OBJECTIVE BOX
Patient is a 82y old  Female who presents with a chief complaint of L arm weakness and L facial "tingling" (09 Apr 2023 19:07)      HPI:  82 year old female with MHx of A-fib on Eliquis and ASA, HTN, HLD c/o dizziness and gait instability since 10pm with associated heaviness of L arm and headache. Reports no photophobia or neck stiffness and no fever/chills, vision changes, word finding difficulty/slurred speech, chest pain, sob, abdominal pain, or urinary sxs. Reports compliance with medications;        ED course: Code stroke called. Neurology c/s; NIHSS: 2; preMRS: 0; Passed dysphagia screening;   (08 Apr 2023 06:21)      REVIEW OF SYSTEMS  Constitutional - No fever, No weight loss, No fatigue  HEENT - No eye pain, No visual disturbances, No difficulty hearing, No tinnitus, No vertigo, No neck pain  Respiratory - No cough, No wheezing, No shortness of breath  Cardiovascular - No chest pain, No palpitations  Gastrointestinal - No abdominal pain, No nausea, No vomiting, No diarrhea, No constipation  Genitourinary - No dysuria, No frequency, No hematuria, No incontinence  Neurological - No headaches, No memory loss, No loss of strength, No numbness, No tremors  Skin - No itching, No rashes, No lesions   Endocrine - No temperature intolerance  Musculoskeletal - No joint pain, No joint swelling, No muscle pain  Psychiatric - No depression, No anxiety    PAST MEDICAL & SURGICAL HISTORY  HTN (hypertension)    HLD (hyperlipidemia)    TIA (transient ischemic attack)    H/O bilateral hip replacements    H/O total knee replacement        SOCIAL HISTORY  Smoking - Denied  EtOH - Denied   Drugs - Denied    FUNCTIONAL HISTORY  Lives with , daughter and son in home with stairs to bedroom/bathroom  Independent without device  owns cane and rw    CURRENT FUNCTIONAL STATUS  4/8 Bed Mobility: Sit to Supine:     · Level of [place]	supervision  · Physical Assist/Nonphysical Assist	supervision; nonverbal cues (demo/gestures); 1 person assist    Bed Mobility: Supine to Sit:     · Level of [place]	supervision  · Physical Assist/Nonphysical Assist	supervision; nonverbal cues (demo/gestures); 1 person assist    Bed Mobility Analysis:     · Bed Mobility Limitations	decreased ability to use legs for bridging/pushing; impaired ability to control trunk for mobility  · Impairments Contributing to Impaired Bed Mobility	impaired balance; impaired postural control; decreased strength    Transfer: Sit to Stand:     · Level of [place]	supervision  · Physical Assist/Nonphysical Assist	supervision; nonverbal cues (demo/gestures); 1 person assist  · Weight-Bearing Restrictions	full weight-bearing    Transfer: Stand to Sit:     · Level of [place]	supervision  · Physical Assist/Nonphysical Assist	supervision; nonverbal cues (demo/gestures); 1 person assist  · Weight-Bearing Restrictions	full weight-bearing    Sit/Stand Transfer Safety Analysis:     · Transfer Safety Concerns Noted	decreased step length  · Impairments Contributing to Impaired Transfers	impaired balance; impaired postural control; decreased strength    Gait Skills:     · Level of [place]	supervision  · Physical Assist/Nonphysical Assist	supervision; nonverbal cues (demo/gestures); 1 person assist  · Weight-Bearing Restrictions	full weight-bearing  · Gait Distance	25 feet    Gait Analysis:     · Gait Pattern Used	2-point gait  · Gait Deviations Noted	decreased juanita; decreased step length  · Impairments Contributing to Gait Deviations	impaired balance; impaired postural control; decreased strength        FAMILY HISTORY   No pertinent family history in first degree relatives        RECENT LABS/IMAGING  < from: MR Head No Cont (04.08.23 @ 19:35) >  IMPRESSION:  No MRI evidence of acute intracranial pathology.    Mild generalized cerebral volume loss.    Mild to moderate patchy chronic microvascular ischemic disease.    < end of copied text >    CBC Full  -  ( 10 Apr 2023 06:00 )  WBC Count : 6.31 K/uL  RBC Count : 4.39 M/uL  Hemoglobin : 13.1 g/dL  Hematocrit : 40.2 %  Platelet Count - Automated : 242 K/uL  Mean Cell Volume : 91.6 fL  Mean Cell Hemoglobin : 29.8 pg  Mean Cell Hemoglobin Concentration : 32.6 gm/dL  Auto Neutrophil # : x  Auto Lymphocyte # : x  Auto Monocyte # : x  Auto Eosinophil # : x  Auto Basophil # : x  Auto Neutrophil % : x  Auto Lymphocyte % : x  Auto Monocyte % : x  Auto Eosinophil % : x  Auto Basophil % : x    04-09    143  |  107  |  21  ----------------------------<  92  4.4   |  24  |  0.97    Ca    9.8      09 Apr 2023 05:55  Phos  4.5     04-09  Mg     1.90     04-09          VITALS  T(C): 36.7 (04-10-23 @ 04:45), Max: 36.8 (04-09-23 @ 18:56)  HR: 88 (04-10-23 @ 04:45) (72 - 88)  BP: 110/64 (04-10-23 @ 04:45) (109/66 - 129/78)  RR: 18 (04-10-23 @ 04:45) (17 - 18)  SpO2: 100% (04-10-23 @ 04:45) (97% - 100%)  Wt(kg): --    ALLERGIES  No Known Allergies      MEDICATIONS   acetaminophen     Tablet .. 650 milliGRAM(s) Oral every 6 hours PRN  apixaban 5 milliGRAM(s) Oral every 12 hours  atorvastatin 80 milliGRAM(s) Oral at bedtime  folic acid 1 milliGRAM(s) Oral daily  meclizine 25 milliGRAM(s) Oral every 8 hours PRN  meclizine 25 milliGRAM(s) Oral three times a day  metoprolol tartrate 12.5 milliGRAM(s) Oral every 12 hours  pantoprazole    Tablet 40 milliGRAM(s) Oral before breakfast      ----------------------------------------------------------------------------------------  PHYSICAL EXAM  Constitutional - NAD, Comfortable  HEENT - NCAT, EOMI  Neck - Supple, No limited ROM  Chest - CTA bilaterally, No wheeze, No rhonchi, No crackles  Cardiovascular - RRR, S1S2, No murmurs  Abdomen - BS+, Soft, NTND  Extremities - No C/C/E, No calf tenderness   Neurologic Exam -                    Cognitive - Awake, Alert, AAO to self, place, date, year, situation     Communication - Fluent, No dysarthria     Cranial Nerves - CN 2-12 intact     Motor - No focal deficits                    LEFT    UE - ShAB 5/5, EF 5/5, EE 5/5, WE 5/5,  5/5                    RIGHT UE - ShAB 5/5, EF 5/5, EE 5/5, WE 5/5,  5/5                    LEFT    LE - HF 5/5, KE 5/5, DF 5/5, PF 5/5                    RIGHT LE - HF 5/5, KE 5/5, DF 5/5, PF 5/5        Sensory - Intact to LT     Reflexes - DTR Intact, No primitive reflexive     Coordination - FTN intact     OculoVestibular - No saccades, No nystagmus, VOR         Balance - WNL Static  Psychiatric - Mood stable, Affect WNL  ----------------------------------------------------------------------------------------  ASSESSMENT/PLAN  81 yo f p/w arm heaviness and headache  MRI negative for cva  on eliquis for afib, metoprolol  meclizine  Pain -acetaminophen  DVT PPX - on eliquis  Rehab -    incomplete note, consult in progress Patient is a 82y old  Female who presents with a chief complaint of L arm weakness and L facial "tingling" (09 Apr 2023 19:07)      HPI:  82 year old female with MHx of A-fib on Eliquis and ASA, HTN, HLD c/o dizziness and gait instability since 10pm with associated heaviness of L arm and headache. Reports no photophobia or neck stiffness and no fever/chills, vision changes, word finding difficulty/slurred speech, chest pain, sob, abdominal pain, or urinary sxs. Reports compliance with medications;        ED course: Code stroke called. Neurology c/s; NIHSS: 2; preMRS: 0; Passed dysphagia screening;   (08 Apr 2023 06:21)    reports tingling/headache and L arm heaviness have resolved.      REVIEW OF SYSTEMS  reports difficulty with balance     PAST MEDICAL & SURGICAL HISTORY  HTN (hypertension)    HLD (hyperlipidemia)    TIA (transient ischemic attack)    H/O bilateral hip replacements    H/O total knee replacement    SOCIAL HISTORY  Smoking - Denied  EtOH - Denied   Drugs - Denied    FUNCTIONAL HISTORY  Lives with , daughter and son in home with stairs to bedroom/bathroom  Independent without device  owns cane and rw    CURRENT FUNCTIONAL STATUS  4/8 Bed Mobility: Sit to Supine:     · Level of [place]	supervision  · Physical Assist/Nonphysical Assist	supervision; nonverbal cues (demo/gestures); 1 person assist    Bed Mobility: Supine to Sit:     · Level of [place]	supervision  · Physical Assist/Nonphysical Assist	supervision; nonverbal cues (demo/gestures); 1 person assist    Bed Mobility Analysis:     · Bed Mobility Limitations	decreased ability to use legs for bridging/pushing; impaired ability to control trunk for mobility  · Impairments Contributing to Impaired Bed Mobility	impaired balance; impaired postural control; decreased strength    Transfer: Sit to Stand:     · Level of [place]	supervision  · Physical Assist/Nonphysical Assist	supervision; nonverbal cues (demo/gestures); 1 person assist  · Weight-Bearing Restrictions	full weight-bearing    Transfer: Stand to Sit:     · Level of [place]	supervision  · Physical Assist/Nonphysical Assist	supervision; nonverbal cues (demo/gestures); 1 person assist  · Weight-Bearing Restrictions	full weight-bearing    Sit/Stand Transfer Safety Analysis:     · Transfer Safety Concerns Noted	decreased step length  · Impairments Contributing to Impaired Transfers	impaired balance; impaired postural control; decreased strength    Gait Skills:     · Level of [place]	supervision  · Physical Assist/Nonphysical Assist	supervision; nonverbal cues (demo/gestures); 1 person assist  · Weight-Bearing Restrictions	full weight-bearing  · Gait Distance	25 feet    Gait Analysis:     · Gait Pattern Used	2-point gait  · Gait Deviations Noted	decreased juanita; decreased step length  · Impairments Contributing to Gait Deviations	impaired balance; impaired postural control; decreased strength        FAMILY HISTORY   No pertinent family history in first degree relatives        RECENT LABS/IMAGING  < from: MR Head No Cont (04.08.23 @ 19:35) >  IMPRESSION:  No MRI evidence of acute intracranial pathology.    Mild generalized cerebral volume loss.    Mild to moderate patchy chronic microvascular ischemic disease.    < end of copied text >    CBC Full  -  ( 10 Apr 2023 06:00 )  WBC Count : 6.31 K/uL  RBC Count : 4.39 M/uL  Hemoglobin : 13.1 g/dL  Hematocrit : 40.2 %  Platelet Count - Automated : 242 K/uL  Mean Cell Volume : 91.6 fL  Mean Cell Hemoglobin : 29.8 pg  Mean Cell Hemoglobin Concentration : 32.6 gm/dL  Auto Neutrophil # : x  Auto Lymphocyte # : x  Auto Monocyte # : x  Auto Eosinophil # : x  Auto Basophil # : x  Auto Neutrophil % : x  Auto Lymphocyte % : x  Auto Monocyte % : x  Auto Eosinophil % : x  Auto Basophil % : x    04-09    143  |  107  |  21  ----------------------------<  92  4.4   |  24  |  0.97    Ca    9.8      09 Apr 2023 05:55  Phos  4.5     04-09  Mg     1.90     04-09          VITALS  T(C): 36.7 (04-10-23 @ 04:45), Max: 36.8 (04-09-23 @ 18:56)  HR: 88 (04-10-23 @ 04:45) (72 - 88)  BP: 110/64 (04-10-23 @ 04:45) (109/66 - 129/78)  RR: 18 (04-10-23 @ 04:45) (17 - 18)  SpO2: 100% (04-10-23 @ 04:45) (97% - 100%)  Wt(kg): --    ALLERGIES  No Known Allergies      MEDICATIONS   acetaminophen     Tablet .. 650 milliGRAM(s) Oral every 6 hours PRN  apixaban 5 milliGRAM(s) Oral every 12 hours  atorvastatin 80 milliGRAM(s) Oral at bedtime  folic acid 1 milliGRAM(s) Oral daily  meclizine 25 milliGRAM(s) Oral every 8 hours PRN  meclizine 25 milliGRAM(s) Oral three times a day  metoprolol tartrate 12.5 milliGRAM(s) Oral every 12 hours  pantoprazole    Tablet 40 milliGRAM(s) Oral before breakfast      ----------------------------------------------------------------------------------------  PHYSICAL EXAM  Constitutional - NAD, Comfortable  HEENT - NCAT, EOMI   Chest - no respiratory distress  Cardiovascular - RRR, S1S2  Abdomen - BS+, Soft, NTND  Extremities - No C/C/E, No calf tenderness   Neurologic Exam -                    Cognitive - Awake, Alert, AAO to self, place, date, year, situation     Communication - Fluent, No dysarthria     Cranial Nerves - CN 2-12 intact     Motor - No focal deficits                    LEFT    UE - ShAB 5/5, EF 5/5, EE 5/5, WE 5/5,  5/5                    RIGHT UE - ShAB 5/5, EF 5/5, EE 5/5, WE 5/5,  5/5                    LEFT    LE - HF 5/5, KE 5/5, DF 5/5, PF 5/5                    RIGHT LE - HF 5/5, KE 5/5, DF 5/5, PF 5/5        Sensory - Intact to LT       Balance - WNL Static  Psychiatric - Mood stable, Affect WNL  ----------------------------------------------------------------------------------------  ASSESSMENT/PLAN  83 yo f p/w arm heaviness and headache  MRI negative for cva  on eliquis for afib, metoprolol  meclizine for dizziness  Pain -acetaminophen  DVT PPX - on eliquis  Rehab -   recommend home with outpatient vestibular rehab when medically cleared  recommend attempting stairs prior to discharge if able.

## 2023-04-11 ENCOUNTER — APPOINTMENT (OUTPATIENT)
Dept: PULMONOLOGY | Facility: CLINIC | Age: 83
End: 2023-04-11
Payer: MEDICARE

## 2023-04-11 VITALS — SYSTOLIC BLOOD PRESSURE: 118 MMHG | HEART RATE: 80 BPM | DIASTOLIC BLOOD PRESSURE: 70 MMHG | OXYGEN SATURATION: 99 %

## 2023-04-11 DIAGNOSIS — R53.82 CHRONIC FATIGUE, UNSPECIFIED: ICD-10-CM

## 2023-04-11 DIAGNOSIS — R73.03 PREDIABETES.: ICD-10-CM

## 2023-04-11 DIAGNOSIS — E78.5 HYPERLIPIDEMIA, UNSPECIFIED: ICD-10-CM

## 2023-04-11 PROCEDURE — 99496 TRANSJ CARE MGMT HIGH F2F 7D: CPT | Mod: 25

## 2023-04-11 PROCEDURE — 36415 COLL VENOUS BLD VENIPUNCTURE: CPT

## 2023-04-11 NOTE — PHYSICAL EXAM
[No Acute Distress] : no acute distress [Normal Oropharynx] : normal oropharynx [I] : Mallampati Class: I [Normal Appearance] : normal appearance [Supple] : supple [No Neck Mass] : no neck mass [No JVD] : no jvd [Normal Rate/Rhythm] : normal rate/rhythm [Normal S1, S2] : normal s1, s2 [No Murmurs] : no murmurs [No Rubs] : no rubs [No Gallops] : no gallops [No Resp Distress] : no resp distress [No Acc Muscle Use] : no acc muscle use [Normal Palpation] : normal palpation [Normal Rhythm and Effort] : normal rhythm and effort [Clear to Auscultation Bilaterally] : clear to auscultation bilaterally [Normal to Percussion] : normal to percussion [No Abnormalities] : no abnormalities [Benign] : benign [Not Tender] : not tender [Soft] : soft [No HSM] : no hsm [Normal Bowel Sounds] : normal bowel sounds [Normal Gait] : normal gait [No Clubbing] : no clubbing [No Cyanosis] : no cyanosis [No Edema] : no edema [FROM] : FROM [Normal Color/ Pigmentation] : normal color/ pigmentation [No Focal Deficits] : no focal deficits [No Sensory Deficits] : no sensory deficits [Oriented x3] : oriented x3 [Normal Mood] : normal mood [Cranial Nerves Intact] : cranial nerves intact [Normal Reflexes] : normal reflexes [TextBox_105] : Lower extremity nontender varicose veins [TextBox_132] : mild left  facxial droop, wit ?  nystagmus , mild balance impairment with ambulation

## 2023-04-11 NOTE — HISTORY OF PRESENT ILLNESS
[Never] : never [Obstructive Sleep Apnea] : obstructive sleep apnea [Daytime Somnolence] : daytime somnolence [TextBox_4] : 81-year-old\par Transition of  care post Discharge\par 82-year-old female\par noted just started several days antivert but still dizzy\par  NEuro in hospital on Eliquis  but  held ASA\par LIJ\par L arm weakness and L facial tingling\par vertigo\par  CT MR neg without CVA on imaging\par  TTE neg  valvular disease  and nl LV RV\par out pt PT\par  A FIB\par  on Eliquis and ASA \par HTN HLD\par started on meclizine \par BB\par  olmesartan\par fenofibrate\par LABS\par  CBC nl\par  Lytes nl\par creat 0.97\par Phos MG nl\par MR brain\par No MRI evidence of acute intracranial pathology\par Mild generalized cerebral volume loss\par Mild to moderate patchy chronic microvascular ischemic disease\par CT of the brain with CTA brain\par Overall impression no acute intracranial hemorrhage or mass effect\par Patent intracranial circulation no flow-limiting stenosis or occlusion\par CTA of neck patent cervical vasculature with no flow-limiting stenosis or occlusion\par CT perfusion no core infarct\par \par  received CPAP AUTO CPAP had  some difficulty with use not yet using \par s/p CPA f/u\par Patient stable. O2 Sat 92% on room air HR 80. Patient has a ResMed Airsense 11 s/n [id number] D/N 725 with a Estevez FX mask size medium. Minimum pressure 6cmH2O Maximum pressure [id number]\par \par noted change with Dr Gaffney change to Eliquis and Off Coumadin\par noted up  t0  date labs EKG with Dr Gaffney\par data reviewed\par EKG noted echocardiogram\par Estimated ejection fraction 64%\par Mild to moderate mitral regurgitation\par Mild to moderate tricuspid regurgitation\par Normal globally LV dysfunction\par No reported pulmonary hypertension\par Data review\par PT INR 1.50 TSH 1.99 normal range\par CRP negative\par Serum electrolytes normal\par Renal function normal\par Random glucose 106\par Creatinine 0.81\par CBC: Normal range\par WBC 6.02 hemoglobin 12.5 hematocrit 40.3\par Platelets 268,000  normal range\par LDL 81\par Total cholesterol 170\par Homocystine 12.7 normal range\par Hemoglobin A1c 6.1% prediabetes\par Management \par \par f/u Nov 2022 RSV with resolved cough\par \par f/u sleep study-AVA\par Well visit completed November 10, 2021\par Data reviewed procedure as noted\par Feeling well\par No active complaints\par Does have some fatigue\par Occasional arthritic pain\par Able to perform normal daily activities of living\par No depression\par Eating well\par Weight stable appetite normal\par GI up-to-date\par Cardiology up-to-date reviewed data noted\par Past medical history TIA two thousand sixteen on Coumadin\par History of reflux not active\par History history hypertension\par History of LVH\par History of osteoarthritis\par History of paroxysmal atrial fibrillation on\par Hyperlipidemia on statin therapy\par Varicose veins asymptomatic\par \par States Covid vaccine protocol to dose Pfizer up-to-date\par Received flu shotfall 2021\par DTaP 5 years ago\par Question Shingrix\par Question pneumonia vaccine [TextBox_100] : 3/9-10/2022 [TextBox_108] : 12 [TextBox_112] : 0.9 [TextBox_116] : 82.1

## 2023-04-11 NOTE — DISCUSSION/SUMMARY
[FreeTextEntry1] : Transition of care posthospitalization\par Imaging CT MRI negative\par Patient with history of atrial fibrillation remains in normal sinus rhythm\par Hospital neurologist did discontinue\par Remains on Eliquis\par Clinical findings today very borderline mild nystagmus mild left facial droop and gait disturbance with ambulation\par ENT evaluation to evaluate vestibular dysfunction\par Follow-up with neurology within the next several days may need repeat imaging for completion\par Physical therapy for gait balance training\par Blood work to include CBC basic metabolic profile inflammatory markers and RAFA to rule out underlying evidence of a vasculitis no CT imaging as noted abnormal\par \par \par Abnormal spirometry rule out obstructive ventilatory impairment-repeat study with PFT-PFT demonstrates a mild restrictive ventilatory impairment with a very mild gas exchange impairment post RSV infection\par PreDM last Dec A1C improved 6.1\par chronic  fatigue daytime hypersomnolence \par AVA mild AHI 12\par PREDM\par Osteoporosis follow-up bone density November 2023- request to hold oral tx\par History of TIA- coumadin\par Hyperlipidemia\par Hypertension\par History of paroxysmal atrial fibrillation\par osteoarthritis\par History of reflux and active\par Abnormal urine-check urine culture\par Check status regarding pneumococcal vaccine and shingles vaccine\par noted per  cardiology [facility] TRI\par Office follow-up 1 months\par poor tolerance AUTO CPAP 6-16 cm H20\par  risks clinical benefits of CPAP\par Did address other treatment options oral appliance but favor CPAP as first choice\par All questions answered\par Schedule appointment for CPAP initiation followed by a schedule in the office\par Data compliance adherence addressed with greater than 70% usage hours greater than 4\par \par DEXA Nov 2023 Noted T DAP is up to date  received 6 yrs prior\par CPAP initiation visit\par Plan follow-up to check CPAP data for obstructive sleep apnea\par Prior NEURO Calvin Bush MD\par PT PMR

## 2023-04-11 NOTE — PROCEDURE
[FreeTextEntry1] : PFT body box March 21, 2023\par Post RSV infection\par Normal flow rates\par Ratio 75\par TLC 73% predicted\par Resistance and specific conductance above normal range\par Diffusion mildly reduced 60% predicted\par Overall impression mild restrictive ventilatory impairment with a very mild diffusion gas exchange impairment\par Hemoglobin 12.5\par Significant interval improvement at FEV1 compared to February 7, 2023\par \par Spirometry no bronchodilator February 7, 2023\par Limited study\par Rule out severe obstructive ventilatory impairment\par FEV1 FVC ratio 41\par \par \par Chest x-ray PA lateral 11/8/2022\par Cardiac size normal\par Aorta uncoiled\par Calcification of the trachea and mainstem bronchi\par Lung fields otherwise clear without parenchymal infiltrates pleural effusions or dominant pulmonary nodules\par Levoscoliosis\par \par POCT 2/27/22\par HGBA1C 6.3\par Glucose 111\par \par POCT 11/10/21\par HGBA1C 6.2\par GLUcose 122\par \par Sleep study\par March 9 March 10, 2022\par Mild obstructive sleep apnea\par AHI 12\par Percent time less than 90% room air 0.9%\par \par Chest x-ray PA lateral\par November 10, 2021\par Normal cardiac size\par Uncoiled aorta\par Levoscoliosis\par Clear lungs without parenchymal infiltrates pleural effusions or dominant pulmonary nodules\par \par Data review \par Last chest x-ray reviewed dating back to September 14, 2019\par indication was cough at that time\par impression clear lungs levoscoliosis\par degenerative changes of the shoulders and spine\par \par management was Frank Gaffney MD cardiology\par November 5 2021\par PT/INR 3.57- coumadin\par TSH 2.12\par CRP less than 3 normal range\par serum electrolytes\par serum sodium 145 potassium 5.1 serum chloride 107\par serum bicarb 23\par glucose 97\par BUN 21 creatinine 0.91\par serum calcium 10.0\par total protein 6.7 liver function testing normal\par alkaline phosphatase 60\par total bilirubin normal 0.5\par lipid profile\par direct LDL 80\par total cholesterol 165\par HDL 61\par triglycerides 118\par LDL 80\par CBC\par WBC 6.53 hemoglobin 13.0 hematocrit 41.8\par platelet count 266,000\par homocystine normal range at 13.6\par PTT 57.5\par cardiology pharmacologic nuclear stress test\par November 5, 2021 Frank Gu MD\par negative EKG during pharmacologic stress test\par heart rate response of appropriate blood pressure\par myocardial perfusion SPECT results normal\par baseline EKG of November 5, 2021\par normal sinus rhythm\par incomplete right bundle branch block colonoscopy 2015\par diverticulosis\par upper endoscopy 2015\par 3 cm hiatal hernia\par suspected Kovacs's esophagitis\par \par Noted older data reviewed dating back to September 30, 2016 hemoglobin A1c 6.6 September 30, 2016 CT brain stroke protocol\par no acute intracranial pathology\par MRI brain September 30, 2016\par chief complaint at the time headache numbness\par negative evidence for acute infarct\par mild chronic white matter changes\par brain MRA\par no evidence for acute infarct or hemorrhage\par negative MRA study\par \par PCV 23 February 7, 2023 administered\par \par Blood  draw

## 2023-04-12 LAB
ANION GAP SERPL CALC-SCNC: 12 MMOL/L
BASOPHILS # BLD AUTO: 0.06 K/UL
BASOPHILS NFR BLD AUTO: 0.9 %
BUN SERPL-MCNC: 21 MG/DL
CALCIUM SERPL-MCNC: 10.6 MG/DL
CHLORIDE SERPL-SCNC: 103 MMOL/L
CO2 SERPL-SCNC: 27 MMOL/L
CREAT SERPL-MCNC: 0.99 MG/DL
CRP SERPL-MCNC: <3 MG/L
EGFR: 57 ML/MIN/1.73M2
EOSINOPHIL # BLD AUTO: 0.3 K/UL
EOSINOPHIL NFR BLD AUTO: 4.5 %
ERYTHROCYTE [SEDIMENTATION RATE] IN BLOOD BY WESTERGREN METHOD: 4 MM/HR
GLUCOSE SERPL-MCNC: 156 MG/DL
HCT VFR BLD CALC: 45.5 %
HGB BLD-MCNC: 14.1 G/DL
IMM GRANULOCYTES NFR BLD AUTO: 0.2 %
LYMPHOCYTES # BLD AUTO: 1.55 K/UL
LYMPHOCYTES NFR BLD AUTO: 23.3 %
MAN DIFF?: NORMAL
MCHC RBC-ENTMCNC: 30.6 PG
MCHC RBC-ENTMCNC: 31 GM/DL
MCV RBC AUTO: 98.7 FL
MONOCYTES # BLD AUTO: 0.55 K/UL
MONOCYTES NFR BLD AUTO: 8.3 %
NEUTROPHILS # BLD AUTO: 4.19 K/UL
NEUTROPHILS NFR BLD AUTO: 62.8 %
PLATELET # BLD AUTO: 249 K/UL
POTASSIUM SERPL-SCNC: 4.7 MMOL/L
RBC # BLD: 4.61 M/UL
RBC # FLD: 13.6 %
SODIUM SERPL-SCNC: 141 MMOL/L
WBC # FLD AUTO: 6.66 K/UL

## 2023-04-13 LAB
ESTIMATED AVERAGE GLUCOSE: 126 MG/DL
FOLATE SERPL-MCNC: >20 NG/ML
HBA1C MFR BLD HPLC: 6 %
VIT B12 SERPL-MCNC: 563 PG/ML

## 2023-04-14 LAB — ANACR T: NEGATIVE

## 2023-04-18 ENCOUNTER — APPOINTMENT (OUTPATIENT)
Dept: PULMONOLOGY | Facility: CLINIC | Age: 83
End: 2023-04-18
Payer: MEDICARE

## 2023-04-18 ENCOUNTER — APPOINTMENT (OUTPATIENT)
Dept: PULMONOLOGY | Facility: CLINIC | Age: 83
End: 2023-04-18

## 2023-04-18 VITALS — OXYGEN SATURATION: 98 % | DIASTOLIC BLOOD PRESSURE: 78 MMHG | HEART RATE: 72 BPM | SYSTOLIC BLOOD PRESSURE: 121 MMHG

## 2023-04-18 DIAGNOSIS — R42 DIZZINESS AND GIDDINESS: ICD-10-CM

## 2023-04-18 PROCEDURE — 99213 OFFICE O/P EST LOW 20 MIN: CPT

## 2023-04-18 NOTE — DISCUSSION/SUMMARY
[FreeTextEntry1] : f/u  no new neuro sxs over  past  week\par Imaging CT MRI negative\par Patient with history of atrial fibrillation remains in normal sinus rhythm\par Hospital neurologist did discontinue\par Remains on Eliquis\par Clinical findings today very borderline mild nystagmus mild left facial droop and gait disturbance with ambulation\par ENT evaluation to evaluate vestibular dysfunction\par Follow-up with neurology within the next several days may need repeat imaging for completion\par Physical therapy for gait balance training\par Blood work to include CBC basic metabolic profile inflammatory markers and RAFA to rule out underlying evidence of a vasculitis no CT imaging as noted abnormal\par \par \par Abnormal spirometry rule out obstructive ventilatory impairment-repeat study with PFT-PFT demonstrates a mild restrictive ventilatory impairment with a very mild gas exchange impairment post RSV infection\par PreDM  A1C improved 6.0\par chronic  fatigue daytime hypersomnolence \par AVA mild AHI 12\par PREDM\par Osteoporosis follow-up bone density November 2023- request to hold oral tx\par History of TIA- coumadin\par Hyperlipidemia\par Hypertension\par History of paroxysmal atrial fibrillation\par osteoarthritis\par History of reflux and active\par Abnormal urine-check urine culture\par Check status regarding pneumococcal vaccine and shingles vaccine\par noted per  cardiology Niacin [facility] inc TRI\par Office follow-up 1 months\par poor tolerance AUTO CPAP 6-16 cm H20\par  risks clinical benefits of CPAP\par Did address other treatment options oral appliance but favor CPAP as first choice\par All questions answered\par Schedule appointment for CPAP initiation followed by a schedule in the office\par Data compliance adherence addressed with greater than 70% usage hours greater than 4\par \par DEXA Nov 2023 Noted T DAP is up to date  received 6 yrs prior\par CPAP initiation visit but not yet started  and recommended clinical  benefit\par Plan follow-up to check CPAP data for obstructive sleep apnea\par Prior NEURO Calvin Bush MD\par PMR ENT Dr Norma Mccracken\par PT PMR\par on generic on antivert to decide timing of  dosing and if indicated

## 2023-04-18 NOTE — HISTORY OF PRESENT ILLNESS
[Never] : never [Obstructive Sleep Apnea] : obstructive sleep apnea [Daytime Somnolence] : daytime somnolence [TextBox_4] : 81-year-old\par since  1 week prior  visit no decline  neuro ststus\par Transition of  care post Discharge visit 4/11/23\par 82-year-old female\par noted just started several days antivert but still dizzy\par  NEuro in hospital on Eliquis  but  held ASA\par LIJ\par L arm weakness and L facial tingling\par vertigo\par  CT MR neg without CVA on imaging\par  TTE neg  valvular disease  and nl LV RV\par out pt PT\par  A FIB\par  on Eliquis and ASA \par HTN HLD\par started on meclizine \par BB\par  olmesartan\par fenofibrate\par LABS\par  CBC nl\par  Lytes nl\par creat 0.97\par Phos MG nl\par MR brain\par No MRI evidence of acute intracranial pathology\par Mild generalized cerebral volume loss\par Mild to moderate patchy chronic microvascular ischemic disease\par CT of the brain with CTA brain\par Overall impression no acute intracranial hemorrhage or mass effect\par Patent intracranial circulation no flow-limiting stenosis or occlusion\par CTA of neck patent cervical vasculature with no flow-limiting stenosis or occlusion\par CT perfusion no core infarct\par \par  received CPAP AUTO CPAP had  some difficulty with use not yet using \par s/p CPA f/u\par Patient stable. O2 Sat 92% on room air HR 80. Patient has a ResMed Airsense 11 s/n [id number] D/N 725 with a Estevez FX mask size medium. Minimum pressure 6cmH2O Maximum pressure [id number]\par \par noted change with Dr Gaffney change to Eliquis and Off Coumadin\par noted up  t0  date labs EKG with Dr Gaffney\par data reviewed\par EKG noted echocardiogram\par Estimated ejection fraction 64%\par Mild to moderate mitral regurgitation\par Mild to moderate tricuspid regurgitation\par Normal globally LV dysfunction\par No reported pulmonary hypertension\par Data review\par PT INR 1.50 TSH 1.99 normal range\par CRP negative\par Serum electrolytes normal\par Renal function normal\par Random glucose 106\par Creatinine 0.81\par CBC: Normal range\par WBC 6.02 hemoglobin 12.5 hematocrit 40.3\par Platelets 268,000  normal range\par LDL 81\par Total cholesterol 170\par Homocystine 12.7 normal range\par Hemoglobin A1c 6.1% prediabetes\par Management \par \par f/u Nov 2022 RSV with resolved cough\par \par f/u sleep study-AVA\par Well visit completed November 10, 2021\par Data reviewed procedure as noted\par Feeling well\par No active complaints\par Does have some fatigue\par Occasional arthritic pain\par Able to perform normal daily activities of living\par No depression\par Eating well\par Weight stable appetite normal\par GI up-to-date\par Cardiology up-to-date reviewed data noted\par Past medical history TIA two thousand sixteen on Coumadin\par History of reflux not active\par History history hypertension\par History of LVH\par History of osteoarthritis\par History of paroxysmal atrial fibrillation on\par Hyperlipidemia on statin therapy\par Varicose veins asymptomatic\par \par States Covid vaccine protocol to dose Pfizer up-to-date\par Received flu shotfall 2021\par DTaP 5 years ago\par Question Shingrix\par Question pneumonia vaccine [TextBox_100] : 3/9-10/2022 [TextBox_108] : 12 [TextBox_112] : 0.9 [TextBox_116] : 82.1

## 2023-04-18 NOTE — PHYSICAL EXAM
[No Acute Distress] : no acute distress [Normal Oropharynx] : normal oropharynx [I] : Mallampati Class: I [Normal Appearance] : normal appearance [Supple] : supple [No Neck Mass] : no neck mass [No JVD] : no jvd [Normal Rate/Rhythm] : normal rate/rhythm [Normal S1, S2] : normal s1, s2 [No Murmurs] : no murmurs [No Rubs] : no rubs [No Gallops] : no gallops [No Resp Distress] : no resp distress [No Acc Muscle Use] : no acc muscle use [Normal Palpation] : normal palpation [Normal Rhythm and Effort] : normal rhythm and effort [Clear to Auscultation Bilaterally] : clear to auscultation bilaterally [Normal to Percussion] : normal to percussion [No Abnormalities] : no abnormalities [Benign] : benign [Not Tender] : not tender [Soft] : soft [No HSM] : no hsm [Normal Bowel Sounds] : normal bowel sounds [Normal Gait] : normal gait [No Clubbing] : no clubbing [No Cyanosis] : no cyanosis [No Edema] : no edema [FROM] : FROM [Normal Color/ Pigmentation] : normal color/ pigmentation [No Focal Deficits] : no focal deficits [No Sensory Deficits] : no sensory deficits [Cranial Nerves Intact] : cranial nerves intact [Normal Reflexes] : normal reflexes [Oriented x3] : oriented x3 [Normal Mood] : normal mood [TextBox_105] : Lower extremity nontender varicose veins [TextBox_132] : mild left  facxial droop, wit ?  nystagmus , mild balance impairment with ambulation

## 2023-04-18 NOTE — PROCEDURE
[FreeTextEntry1] : 4/18/23\par  O2 96 % HR 80  RSR\par \par PFT body box March 21, 2023\par Post RSV infection\par Normal flow rates\par Ratio 75\par TLC 73% predicted\par Resistance and specific conductance above normal range\par Diffusion mildly reduced 60% predicted\par Overall impression mild restrictive ventilatory impairment with a very mild diffusion gas exchange impairment\par Hemoglobin 12.5\par Significant interval improvement at FEV1 compared to February 7, 2023\par \par Spirometry no bronchodilator February 7, 2023\par Limited study\par Rule out severe obstructive ventilatory impairment\par FEV1 FVC ratio 41\par \par \par Chest x-ray PA lateral 11/8/2022\par Cardiac size normal\par Aorta uncoiled\par Calcification of the trachea and mainstem bronchi\par Lung fields otherwise clear without parenchymal infiltrates pleural effusions or dominant pulmonary nodules\par Levoscoliosis\par \par POCT 2/27/22\par HGBA1C 6.3\par Glucose 111\par \par POCT 11/10/21\par HGBA1C 6.2\par GLUcose 122\par \par Sleep study\par March 9 March 10, 2022\par Mild obstructive sleep apnea\par AHI 12\par Percent time less than 90% room air 0.9%\par \par Chest x-ray PA lateral\par November 10, 2021\par Normal cardiac size\par Uncoiled aorta\par Levoscoliosis\par Clear lungs without parenchymal infiltrates pleural effusions or dominant pulmonary nodules\par \par Data review \par Last chest x-ray reviewed dating back to September 14, 2019\par indication was cough at that time\par impression clear lungs levoscoliosis\par degenerative changes of the shoulders and spine\par \par management was Frank Gaffney MD cardiology\par November 5 2021\par PT/INR 3.57- coumadin\par TSH 2.12\par CRP less than 3 normal range\par serum electrolytes\par serum sodium 145 potassium 5.1 serum chloride 107\par serum bicarb 23\par glucose 97\par BUN 21 creatinine 0.91\par serum calcium 10.0\par total protein 6.7 liver function testing normal\par alkaline phosphatase 60\par total bilirubin normal 0.5\par lipid profile\par direct LDL 80\par total cholesterol 165\par HDL 61\par triglycerides 118\par LDL 80\par CBC\par WBC 6.53 hemoglobin 13.0 hematocrit 41.8\par platelet count 266,000\par homocystine normal range at 13.6\par PTT 57.5\par cardiology pharmacologic nuclear stress test\par November 5, 2021 Frank Gu MD\par negative EKG during pharmacologic stress test\par heart rate response of appropriate blood pressure\par myocardial perfusion SPECT results normal\par baseline EKG of November 5, 2021\par normal sinus rhythm\par incomplete right bundle branch block colonoscopy 2015\par diverticulosis\par upper endoscopy 2015\par 3 cm hiatal hernia\par suspected Kovacs's esophagitis\par \par Noted older data reviewed dating back to September 30, 2016 hemoglobin A1c 6.6 September 30, 2016 CT brain stroke protocol\par no acute intracranial pathology\par MRI brain September 30, 2016\par chief complaint at the time headache numbness\par negative evidence for acute infarct\par mild chronic white matter changes\par brain MRA\par no evidence for acute infarct or hemorrhage\par negative MRA study\par \par PCV 23 February 7, 2023 administered\par \par Blood  draw

## 2023-06-27 ENCOUNTER — APPOINTMENT (OUTPATIENT)
Dept: PULMONOLOGY | Facility: CLINIC | Age: 83
End: 2023-06-27
Payer: MEDICARE

## 2023-06-27 VITALS
BODY MASS INDEX: 24.99 KG/M2 | HEART RATE: 87 BPM | OXYGEN SATURATION: 98 % | TEMPERATURE: 97.3 F | DIASTOLIC BLOOD PRESSURE: 72 MMHG | SYSTOLIC BLOOD PRESSURE: 114 MMHG | WEIGHT: 150 LBS | HEIGHT: 65 IN

## 2023-06-27 DIAGNOSIS — R94.2 ABNORMAL RESULTS OF PULMONARY FUNCTION STUDIES: ICD-10-CM

## 2023-06-27 DIAGNOSIS — I48.91 UNSPECIFIED ATRIAL FIBRILLATION: ICD-10-CM

## 2023-06-27 DIAGNOSIS — R05.9 COUGH, UNSPECIFIED: ICD-10-CM

## 2023-06-27 PROCEDURE — 99213 OFFICE O/P EST LOW 20 MIN: CPT | Mod: 25

## 2023-06-27 PROCEDURE — 94010 BREATHING CAPACITY TEST: CPT

## 2023-06-27 PROCEDURE — 94729 DIFFUSING CAPACITY: CPT

## 2023-06-27 PROCEDURE — 94726 PLETHYSMOGRAPHY LUNG VOLUMES: CPT

## 2023-06-27 PROCEDURE — ZZZZZ: CPT

## 2023-06-27 NOTE — HISTORY OF PRESENT ILLNESS
[Never] : never [Obstructive Sleep Apnea] : obstructive sleep apnea [Daytime Somnolence] : daytime somnolence [TextBox_4] : 81-year-old\par AVA non adherent use of CPAP \par since  1 week prior  visit no decline  neuro status\par Transition of  care post Discharge visit 4/11/23\par 82-year-old female\par noted just started several days antivert but still dizzy\par  NEuro in hospital on Eliquis  but  held ASA\par LIJ\par L arm weakness and L facial tingling\par vertigo\par  CT MR neg without CVA on imaging\par  TTE neg  valvular disease  and nl LV RV\par out pt PT\par  A FIB\par  on Eliquis and ASA \par HTN HLD\par started on meclizine \par BB\par  olmesartan\par fenofibrate\par LABS\par  CBC nl\par  Lytes nl\par creat 0.97\par Phos MG nl\par MR brain\par No MRI evidence of acute intracranial pathology\par Mild generalized cerebral volume loss\par Mild to moderate patchy chronic microvascular ischemic disease\par CT of the brain with CTA brain\par Overall impression no acute intracranial hemorrhage or mass effect\par Patent intracranial circulation no flow-limiting stenosis or occlusion\par CTA of neck patent cervical vasculature with no flow-limiting stenosis or occlusion\par CT perfusion no core infarct\par \par  received CPAP AUTO CPAP had  some difficulty with use not yet using \par s/p CPA f/u\par Patient stable. O2 Sat 92% on room air HR 80. Patient has a ResMed Airsense 11 s/n [id number] D/N 725 with a Estevez FX mask size medium. Minimum pressure 6cmH2O Maximum pressure [id number]\par \par noted change with Dr Gaffney change to Eliquis and Off Coumadin\par noted up  t0  date labs EKG with Dr Gaffney\par data reviewed\par EKG noted echocardiogram\par Estimated ejection fraction 64%\par Mild to moderate mitral regurgitation\par Mild to moderate tricuspid regurgitation\par Normal globally LV dysfunction\par No reported pulmonary hypertension\par Data review\par PT INR 1.50 TSH 1.99 normal range\par CRP negative\par Serum electrolytes normal\par Renal function normal\par Random glucose 106\par Creatinine 0.81\par CBC: Normal range\par WBC 6.02 hemoglobin 12.5 hematocrit 40.3\par Platelets 268,000  normal range\par LDL 81\par Total cholesterol 170\par Homocystine 12.7 normal range\par Hemoglobin A1c 6.1% prediabetes\par Management \par \par f/u Nov 2022 RSV with resolved cough\par \par f/u sleep study-AVA\par Well visit completed November 10, 2021\par Data reviewed procedure as noted\par Feeling well\par No active complaints\par Does have some fatigue\par Occasional arthritic pain\par Able to perform normal daily activities of living\par No depression\par Eating well\par Weight stable appetite normal\par GI up-to-date\par Cardiology up-to-date reviewed data noted\par Past medical history TIA two thousand sixteen on Coumadin\par History of reflux not active\par History history hypertension\par History of LVH\par History of osteoarthritis\par History of paroxysmal atrial fibrillation on\par Hyperlipidemia on statin therapy\par Varicose veins asymptomatic\par \par States Covid vaccine protocol to dose Pfizer up-to-date\par Received flu shotfall 2021\par DTaP 5 years ago\par Question Shingrix\par Question pneumonia vaccine [TextBox_100] : 3/9-10/2022 [TextBox_108] : 12 [TextBox_112] : 0.9 [TextBox_116] : 82.1

## 2023-06-27 NOTE — PROCEDURE
[FreeTextEntry1] : PFT June 27, 2023\par Flow rates normal\par Lung volumes normal\par TLC 70% predicted\par Specific reductions in resistance normal range\par Diffusion normal 83% of predicted\par Stable pulmonary physiology interval improvement\par \par 4/18/23\par  O2 96 % HR 80  RSR\par \par PFT body box March 21, 2023\par Post RSV infection\par Normal flow rates\par Ratio 75\par TLC 73% predicted\par Resistance and specific conductance above normal range\par Diffusion mildly reduced 60% predicted\par Overall impression mild restrictive ventilatory impairment with a very mild diffusion gas exchange impairment\par Hemoglobin 12.5\par Significant interval improvement at FEV1 compared to February 7, 2023\par \par Spirometry no bronchodilator February 7, 2023\par Limited study\par Rule out severe obstructive ventilatory impairment\par FEV1 FVC ratio 41\par \par \par Chest x-ray PA lateral 11/8/2022\par Cardiac size normal\par Aorta uncoiled\par Calcification of the trachea and mainstem bronchi\par Lung fields otherwise clear without parenchymal infiltrates pleural effusions or dominant pulmonary nodules\par Levoscoliosis\par \par POCT 2/27/22\par HGBA1C 6.3\par Glucose 111\par \par POCT 11/10/21\par HGBA1C 6.2\par GLUcose 122\par \par Sleep study\par March 9 March 10, 2022\par Mild obstructive sleep apnea\par AHI 12\par Percent time less than 90% room air 0.9%\par \par Chest x-ray PA lateral\par November 10, 2021\par Normal cardiac size\par Uncoiled aorta\par Levoscoliosis\par Clear lungs without parenchymal infiltrates pleural effusions or dominant pulmonary nodules\par \par Data review \par Last chest x-ray reviewed dating back to September 14, 2019\par indication was cough at that time\par impression clear lungs levoscoliosis\par degenerative changes of the shoulders and spine\par \par management was Frank Gaffney MD cardiology\par November 5 2021\par PT/INR 3.57- coumadin\par TSH 2.12\par CRP less than 3 normal range\par serum electrolytes\par serum sodium 145 potassium 5.1 serum chloride 107\par serum bicarb 23\par glucose 97\par BUN 21 creatinine 0.91\par serum calcium 10.0\par total protein 6.7 liver function testing normal\par alkaline phosphatase 60\par total bilirubin normal 0.5\par lipid profile\par direct LDL 80\par total cholesterol 165\par HDL 61\par triglycerides 118\par LDL 80\par CBC\par WBC 6.53 hemoglobin 13.0 hematocrit 41.8\par platelet count 266,000\par homocystine normal range at 13.6\par PTT 57.5\par cardiology pharmacologic nuclear stress test\par November 5, 2021 Frank Gu MD\par negative EKG during pharmacologic stress test\par heart rate response of appropriate blood pressure\par myocardial perfusion SPECT results normal\par baseline EKG of November 5, 2021\par normal sinus rhythm\par incomplete right bundle branch block colonoscopy 2015\par diverticulosis\par upper endoscopy 2015\par 3 cm hiatal hernia\par suspected Kovacs's esophagitis\par \par Noted older data reviewed dating back to September 30, 2016 hemoglobin A1c 6.6 September 30, 2016 CT brain stroke protocol\par no acute intracranial pathology\par MRI brain September 30, 2016\par chief complaint at the time headache numbness\par negative evidence for acute infarct\par mild chronic white matter changes\par brain MRA\par no evidence for acute infarct or hemorrhage\par negative MRA study\par \par PCV 23 February 7, 2023 administered\par \par Blood  draw

## 2023-06-27 NOTE — PHYSICAL EXAM
[No Acute Distress] : no acute distress [Normal Oropharynx] : normal oropharynx [I] : Mallampati Class: I [Normal Appearance] : normal appearance [Supple] : supple [No Neck Mass] : no neck mass [No JVD] : no jvd [Normal Rate/Rhythm] : normal rate/rhythm [Normal S1, S2] : normal s1, s2 [No Murmurs] : no murmurs [No Rubs] : no rubs [No Gallops] : no gallops [No Resp Distress] : no resp distress [No Acc Muscle Use] : no acc muscle use [Normal Palpation] : normal palpation [Normal Rhythm and Effort] : normal rhythm and effort [Clear to Auscultation Bilaterally] : clear to auscultation bilaterally [Normal to Percussion] : normal to percussion [No Abnormalities] : no abnormalities [Benign] : benign [Not Tender] : not tender [Soft] : soft [No HSM] : no hsm [Normal Bowel Sounds] : normal bowel sounds [Normal Gait] : normal gait [No Clubbing] : no clubbing [No Cyanosis] : no cyanosis [No Edema] : no edema [FROM] : FROM [Normal Color/ Pigmentation] : normal color/ pigmentation [No Focal Deficits] : no focal deficits [No Sensory Deficits] : no sensory deficits [Cranial Nerves Intact] : cranial nerves intact [Normal Reflexes] : normal reflexes [Oriented x3] : oriented x3 [Normal Mood] : normal mood [TextBox_132] : mild left  facxial droop, wit ?  nystagmus , mild balance impairment with ambulation [TextBox_105] : Lower extremity nontender varicose veins

## 2023-06-27 NOTE — DISCUSSION/SUMMARY
[FreeTextEntry1] : f/u NEURO\par  issuse SANTHOSH management balance  vertigo\par Imaging CT MRI negative\par Patient with history of atrial fibrillation remains in normal sinus rhythm\par Hospital neurologist \par Remains on Eliquis\par Clinical findings today very borderline mild nystagmus mild left facial droop and gait disturbance with ambulation\par ENT evaluation to evaluate vestibular dysfunction\par Follow-up with neurology within the next several days may need repeat imaging for completion\par Physical therapy for gait balance training\par Blood work to include CBC basic metabolic profile inflammatory markers and RAFA to rule out underlying evidence of a vasculitis no CT imaging as noted abnormal\par \par \par Abnormal spirometry rule out obstructive ventilatory impairment-repeat study with PFT-PFT demonstrates a mild restrictive ventilatory impairment with a very mild gas exchange impairment post RSV infection\par Normalization of pulmonary physiology with interval improvement\par PreDM  A1C improved 6.0\par chronic  fatigue daytime hypersomnolence \par AVA mild AHI 12\par PREDM\par Osteoporosis follow-up bone density November 2023- request to hold oral tx\par History of TIA- coumadin\par Hyperlipidemia\par Hypertension\par History of paroxysmal atrial fibrillation\par osteoarthritis\par History of reflux and active\par Abnormal urine-check urine culture\par Check status regarding pneumococcal vaccine and shingles vaccine\par noted per  cardiology Niacin [facility] inc TRI\par Office follow-up 1 months\par poor tolerance AUTO CPAP 6-16 cm H20\par  risks clinical benefits of CPAP\par Did address other treatment options oral appliance but favor CPAP as first choice\par All questions answered\par Schedule appointment for CPAP initiation followed by a schedule in the office\par Data compliance adherence addressed with greater than 70% usage hours greater than 4\par \par DEXA Nov 2023 Noted T DAP is up to date  received 6 yrs prior\par CPAP initiation visit but not yet started  and recommended clinical  benefit Repeat CPAp initiation visit\par Plan follow-up to check CPAP data for obstructive sleep apnea\par Prior NEURO Calvin Bush MD\par PMR ENT Dr Norma Mccracken\par PT PMR\par on generic on antivert to decide timing of  dosing and if indicated

## 2023-07-06 ENCOUNTER — APPOINTMENT (OUTPATIENT)
Dept: PULMONOLOGY | Facility: CLINIC | Age: 83
End: 2023-07-06
Payer: MEDICARE

## 2023-07-06 VITALS
BODY MASS INDEX: 24.99 KG/M2 | SYSTOLIC BLOOD PRESSURE: 109 MMHG | OXYGEN SATURATION: 95 % | HEART RATE: 85 BPM | HEIGHT: 65 IN | DIASTOLIC BLOOD PRESSURE: 70 MMHG | TEMPERATURE: 98 F | RESPIRATION RATE: 15 BRPM | WEIGHT: 150 LBS

## 2023-07-06 DIAGNOSIS — G47.33 OBSTRUCTIVE SLEEP APNEA (ADULT) (PEDIATRIC): ICD-10-CM

## 2023-07-06 PROCEDURE — 94660 CPAP INITIATION&MGMT: CPT

## 2023-07-06 NOTE — PROCEDURE
[FreeTextEntry1] : Patient stable.  Patient has a ResMed Airsense 11 S/N [id number] D/N 725.  Minimum pressure 6cmH2O Maximum pressure [id number].  Patient fit for a Vitera mask size small.  Patient given full demonstration on how to operate and maintain equipment and supplies.  Patient was able to demonstrate self operation of equipment and supplies.

## 2023-07-28 ENCOUNTER — APPOINTMENT (OUTPATIENT)
Dept: PULMONOLOGY | Facility: CLINIC | Age: 83
End: 2023-07-28

## 2023-08-10 ENCOUNTER — APPOINTMENT (OUTPATIENT)
Dept: OTOLARYNGOLOGY | Facility: CLINIC | Age: 83
End: 2023-08-10
Payer: MEDICARE

## 2023-08-10 VITALS
BODY MASS INDEX: 24.99 KG/M2 | HEIGHT: 65 IN | DIASTOLIC BLOOD PRESSURE: 72 MMHG | SYSTOLIC BLOOD PRESSURE: 116 MMHG | WEIGHT: 150 LBS | HEART RATE: 80 BPM

## 2023-08-10 DIAGNOSIS — H90.3 SENSORINEURAL HEARING LOSS, BILATERAL: ICD-10-CM

## 2023-08-10 DIAGNOSIS — R42 DIZZINESS AND GIDDINESS: ICD-10-CM

## 2023-08-10 PROCEDURE — 92504 EAR MICROSCOPY EXAMINATION: CPT

## 2023-08-10 PROCEDURE — 92567 TYMPANOMETRY: CPT

## 2023-08-10 PROCEDURE — 92557 COMPREHENSIVE HEARING TEST: CPT

## 2023-08-10 PROCEDURE — 99213 OFFICE O/P EST LOW 20 MIN: CPT

## 2023-08-10 RX ORDER — HYDROCHLOROTHIAZIDE 12.5 MG/1
TABLET ORAL
Refills: 0 | Status: ACTIVE | COMMUNITY

## 2023-08-10 RX ORDER — DEXLANSOPRAZOLE 60 MG/1
60 CAPSULE, DELAYED RELEASE ORAL
Qty: 90 | Refills: 3 | Status: DISCONTINUED | COMMUNITY
Start: 2022-02-25 | End: 2023-08-10

## 2023-08-10 NOTE — CONSULT LETTER
[Dear  ___] : Dear  [unfilled], [Consult Letter:] : I had the pleasure of evaluating your patient, [unfilled]. [Please see my note below.] : Please see my note below. [Consult Closing:] : Thank you very much for allowing me to participate in the care of this patient.  If you have any questions, please do not hesitate to contact me. [Sincerely,] : Sincerely, [FreeTextEntry2] : Dr. James [FreeTextEntry3] : Joleen Charles MD, Otology Neurotology & Skull Base Surgery

## 2023-08-10 NOTE — DATA REVIEWED
[de-identified] : An audiogram was ordered and performed including tympanometry, pure tones and speech, for patient's complaint of imbalance I have independently reviewed the patient's audiogram from today and my findings include shamika HF SNHL

## 2023-08-10 NOTE — HISTORY OF PRESENT ILLNESS
[de-identified] : 83 year old woman referred by Dr. Oscar James for dizziness and imbalance that started about 2 years ago.  Reports she had a bad episode during Easter where you felt the room was spinning, felt tingling in her jaw and right arm felt weak. Went to [facility], was told everything was okay and was prescribed Meclizine. Reports intermittent right ear fullness("like water is in the ear") and intermittent right otalgia.  Denies hearing loss, tinnitus, recent fevers or infections Patient describes dizzy as spinning and swaying Patient feels off balance with ONLY with walking and moving--unable to stand for long periods of time States changes in motion, rolling in bed, getting up quickly bring it on Episodes are random--has had two episodes in the past 2 months. Prior treatments include meclizine and PT--Meclizine only PRN History of migraines in her 40s and 50s Denies family history of vertigo, headaches/migraines and motion sickness

## 2023-08-10 NOTE — PHYSICAL EXAM
[Binocular Microscopic Exam] : Binocular microscopic exam was performed [Rinne Test Air Conduction Persists > Bone Conduction Right] : air conduction greater than bone conduction on the right [Rinne Test Air Conduction Persists > Bone Conduction Left] : air conduction greater than bone conduction on the left [Hearing Ferguson Test (Tuning Fork On Forehead)] : no lateralization of tone [Midline] : trachea located in midline position [Normal] : no rashes [Hearing Loss Right Only] : normal [Hearing Loss Left Only] : normal [Nystagmus] : ~T no ~M nystagmus was seen [Fukuda Step Test] : Fukuda Step Test was Positive [Romberg's Sign] : Romberg's sign was present [Fistula Sign] : Fistula Sign: Negative [Past-Pointing] : Past-Pointing: Positive [Lady-[place]] : [place]-Hallpike: Negative

## 2023-11-22 ENCOUNTER — OFFICE (OUTPATIENT)
Dept: URBAN - METROPOLITAN AREA CLINIC 90 | Facility: CLINIC | Age: 83
Setting detail: OPHTHALMOLOGY
End: 2023-11-22
Payer: MEDICARE

## 2023-11-22 DIAGNOSIS — H43.812: ICD-10-CM

## 2023-11-22 DIAGNOSIS — H01.005: ICD-10-CM

## 2023-11-22 DIAGNOSIS — H26.493: ICD-10-CM

## 2023-11-22 DIAGNOSIS — H04.122: ICD-10-CM

## 2023-11-22 DIAGNOSIS — H18.513: ICD-10-CM

## 2023-11-22 DIAGNOSIS — H18.523: ICD-10-CM

## 2023-11-22 PROCEDURE — 92014 COMPRE OPH EXAM EST PT 1/>: CPT | Performed by: OPHTHALMOLOGY

## 2023-11-22 ASSESSMENT — REFRACTION_CURRENTRX
OD_SPHERE: +2.75
OS_ADD: +3.00
OD_AXIS: 163
OS_CYLINDER: +1.00
OD_CYLINDER: +1.00
OS_OVR_VA: 20/
OS_AXIS: 171
OD_VPRISM_DIRECTION: BF
OS_SPHERE: +2.50
OS_VPRISM_DIRECTION: BF
OD_ADD: +3.00
OD_OVR_VA: 20/

## 2023-11-22 ASSESSMENT — CONFRONTATIONAL VISUAL FIELD TEST (CVF)
OS_FINDINGS: FULL
OD_FINDINGS: FULL

## 2023-11-22 ASSESSMENT — REFRACTION_AUTOREFRACTION
OD_SPHERE: +0.25
OD_CYLINDER: -0.75
OS_CYLINDER: -1.25
OD_AXIS: 033
OS_AXIS: 161
OS_SPHERE: +0.50

## 2023-11-22 ASSESSMENT — SPHEQUIV_DERIVED
OS_SPHEQUIV: -0.125
OS_SPHEQUIV: 1.75
OD_SPHEQUIV: -0.125
OD_SPHEQUIV: 1.875

## 2023-11-22 ASSESSMENT — LID EXAM ASSESSMENTS
OD_BLEPHARITIS: RLL 2+
OS_BLEPHARITIS: LLL 2+ 3+

## 2023-11-22 ASSESSMENT — REFRACTION_MANIFEST
OS_VA1: 20/50
OD_CYLINDER: -0.75
OD_VA1: 20/40-
OD_AXIS: 090
OD_SPHERE: +2.25
OS_AXIS: 070
OS_SPHERE: +2.00
OS_CYLINDER: -0.50

## 2023-11-22 ASSESSMENT — CORNEAL DYSTROPHY - POSTERIOR
OD_POSTERIORDYSTROPHY: 2+ GUTTATA
OS_POSTERIORDYSTROPHY: 1+ GUTTATA

## 2023-11-22 ASSESSMENT — CORNEAL EDEMA CLINICAL DESCRIPTION: OD_CORNEALEDEMA: ABSENT

## 2024-03-01 ENCOUNTER — APPOINTMENT (OUTPATIENT)
Dept: OTOLARYNGOLOGY | Facility: CLINIC | Age: 84
End: 2024-03-01
Payer: MEDICARE

## 2024-03-01 PROCEDURE — 92537 CALORIC VSTBLR TEST W/REC: CPT

## 2024-03-01 PROCEDURE — 92567 TYMPANOMETRY: CPT

## 2024-03-01 PROCEDURE — 92547 SUPPLEMENTAL ELECTRICAL TEST: CPT

## 2024-03-01 PROCEDURE — 92540 BASIC VESTIBULAR EVALUATION: CPT

## 2024-03-21 ENCOUNTER — EMERGENCY (EMERGENCY)
Facility: HOSPITAL | Age: 84
LOS: 1 days | Discharge: HOME | End: 2024-03-21
Attending: STUDENT IN AN ORGANIZED HEALTH CARE EDUCATION/TRAINING PROGRAM | Admitting: STUDENT IN AN ORGANIZED HEALTH CARE EDUCATION/TRAINING PROGRAM
Payer: MEDICARE

## 2024-03-21 VITALS
TEMPERATURE: 96 F | DIASTOLIC BLOOD PRESSURE: 75 MMHG | HEART RATE: 96 BPM | WEIGHT: 149.91 LBS | RESPIRATION RATE: 16 BRPM | SYSTOLIC BLOOD PRESSURE: 109 MMHG | OXYGEN SATURATION: 94 %

## 2024-03-21 VITALS
RESPIRATION RATE: 17 BRPM | OXYGEN SATURATION: 97 % | DIASTOLIC BLOOD PRESSURE: 70 MMHG | SYSTOLIC BLOOD PRESSURE: 114 MMHG | TEMPERATURE: 98 F | HEART RATE: 77 BPM

## 2024-03-21 DIAGNOSIS — Z96.659 PRESENCE OF UNSPECIFIED ARTIFICIAL KNEE JOINT: Chronic | ICD-10-CM

## 2024-03-21 DIAGNOSIS — Z96.643 PRESENCE OF ARTIFICIAL HIP JOINT, BILATERAL: Chronic | ICD-10-CM

## 2024-03-21 LAB
ALBUMIN SERPL ELPH-MCNC: 4.1 G/DL — SIGNIFICANT CHANGE UP (ref 3.3–5)
ALP SERPL-CCNC: 67 U/L — SIGNIFICANT CHANGE UP (ref 40–120)
ALT FLD-CCNC: 13 U/L — SIGNIFICANT CHANGE UP (ref 4–33)
ANION GAP SERPL CALC-SCNC: 12 MMOL/L — SIGNIFICANT CHANGE UP (ref 7–14)
AST SERPL-CCNC: 29 U/L — SIGNIFICANT CHANGE UP (ref 4–32)
BASOPHILS # BLD AUTO: 0.03 K/UL — SIGNIFICANT CHANGE UP (ref 0–0.2)
BASOPHILS NFR BLD AUTO: 0.5 % — SIGNIFICANT CHANGE UP (ref 0–2)
BILIRUB SERPL-MCNC: 0.3 MG/DL — SIGNIFICANT CHANGE UP (ref 0.2–1.2)
BUN SERPL-MCNC: 18 MG/DL — SIGNIFICANT CHANGE UP (ref 7–23)
CALCIUM SERPL-MCNC: 9.8 MG/DL — SIGNIFICANT CHANGE UP (ref 8.4–10.5)
CHLORIDE SERPL-SCNC: 103 MMOL/L — SIGNIFICANT CHANGE UP (ref 98–107)
CO2 SERPL-SCNC: 25 MMOL/L — SIGNIFICANT CHANGE UP (ref 22–31)
CREAT SERPL-MCNC: 0.88 MG/DL — SIGNIFICANT CHANGE UP (ref 0.5–1.3)
EGFR: 65 ML/MIN/1.73M2 — SIGNIFICANT CHANGE UP
EOSINOPHIL # BLD AUTO: 0.1 K/UL — SIGNIFICANT CHANGE UP (ref 0–0.5)
EOSINOPHIL NFR BLD AUTO: 1.7 % — SIGNIFICANT CHANGE UP (ref 0–6)
GLUCOSE SERPL-MCNC: 192 MG/DL — HIGH (ref 70–99)
HCT VFR BLD CALC: 39.5 % — SIGNIFICANT CHANGE UP (ref 34.5–45)
HGB BLD-MCNC: 13 G/DL — SIGNIFICANT CHANGE UP (ref 11.5–15.5)
IANC: 4.12 K/UL — SIGNIFICANT CHANGE UP (ref 1.8–7.4)
IMM GRANULOCYTES NFR BLD AUTO: 0.5 % — SIGNIFICANT CHANGE UP (ref 0–0.9)
LYMPHOCYTES # BLD AUTO: 1.26 K/UL — SIGNIFICANT CHANGE UP (ref 1–3.3)
LYMPHOCYTES # BLD AUTO: 21.2 % — SIGNIFICANT CHANGE UP (ref 13–44)
MAGNESIUM SERPL-MCNC: 1.7 MG/DL — SIGNIFICANT CHANGE UP (ref 1.6–2.6)
MCHC RBC-ENTMCNC: 29.4 PG — SIGNIFICANT CHANGE UP (ref 27–34)
MCHC RBC-ENTMCNC: 32.9 GM/DL — SIGNIFICANT CHANGE UP (ref 32–36)
MCV RBC AUTO: 89.4 FL — SIGNIFICANT CHANGE UP (ref 80–100)
MONOCYTES # BLD AUTO: 0.4 K/UL — SIGNIFICANT CHANGE UP (ref 0–0.9)
MONOCYTES NFR BLD AUTO: 6.7 % — SIGNIFICANT CHANGE UP (ref 2–14)
NEUTROPHILS # BLD AUTO: 4.12 K/UL — SIGNIFICANT CHANGE UP (ref 1.8–7.4)
NEUTROPHILS NFR BLD AUTO: 69.4 % — SIGNIFICANT CHANGE UP (ref 43–77)
NRBC # BLD: 0 /100 WBCS — SIGNIFICANT CHANGE UP (ref 0–0)
NRBC # FLD: 0 K/UL — SIGNIFICANT CHANGE UP (ref 0–0)
PHOSPHATE SERPL-MCNC: 3.4 MG/DL — SIGNIFICANT CHANGE UP (ref 2.5–4.5)
PLATELET # BLD AUTO: 223 K/UL — SIGNIFICANT CHANGE UP (ref 150–400)
POTASSIUM SERPL-MCNC: 4.2 MMOL/L — SIGNIFICANT CHANGE UP (ref 3.5–5.3)
POTASSIUM SERPL-SCNC: 4.2 MMOL/L — SIGNIFICANT CHANGE UP (ref 3.5–5.3)
PROT SERPL-MCNC: 6.8 G/DL — SIGNIFICANT CHANGE UP (ref 6–8.3)
RBC # BLD: 4.42 M/UL — SIGNIFICANT CHANGE UP (ref 3.8–5.2)
RBC # FLD: 13.8 % — SIGNIFICANT CHANGE UP (ref 10.3–14.5)
SODIUM SERPL-SCNC: 140 MMOL/L — SIGNIFICANT CHANGE UP (ref 135–145)
WBC # BLD: 5.94 K/UL — SIGNIFICANT CHANGE UP (ref 3.8–10.5)
WBC # FLD AUTO: 5.94 K/UL — SIGNIFICANT CHANGE UP (ref 3.8–10.5)

## 2024-03-21 PROCEDURE — 70496 CT ANGIOGRAPHY HEAD: CPT | Mod: 26,MC

## 2024-03-21 PROCEDURE — 93010 ELECTROCARDIOGRAM REPORT: CPT

## 2024-03-21 PROCEDURE — 99285 EMERGENCY DEPT VISIT HI MDM: CPT

## 2024-03-21 PROCEDURE — 70498 CT ANGIOGRAPHY NECK: CPT | Mod: 26,MC

## 2024-03-21 RX ORDER — ACETAMINOPHEN 500 MG
975 TABLET ORAL ONCE
Refills: 0 | Status: COMPLETED | OUTPATIENT
Start: 2024-03-21 | End: 2024-03-21

## 2024-03-21 RX ORDER — MECLIZINE HCL 12.5 MG
50 TABLET ORAL ONCE
Refills: 0 | Status: COMPLETED | OUTPATIENT
Start: 2024-03-21 | End: 2024-03-21

## 2024-03-21 RX ORDER — ONDANSETRON 8 MG/1
4 TABLET, FILM COATED ORAL ONCE
Refills: 0 | Status: COMPLETED | OUTPATIENT
Start: 2024-03-21 | End: 2024-03-21

## 2024-03-21 RX ORDER — METOCLOPRAMIDE HCL 10 MG
10 TABLET ORAL ONCE
Refills: 0 | Status: COMPLETED | OUTPATIENT
Start: 2024-03-21 | End: 2024-03-21

## 2024-03-21 RX ORDER — KETOROLAC TROMETHAMINE 30 MG/ML
15 SYRINGE (ML) INJECTION ONCE
Refills: 0 | Status: DISCONTINUED | OUTPATIENT
Start: 2024-03-21 | End: 2024-03-21

## 2024-03-21 RX ORDER — SODIUM CHLORIDE 9 MG/ML
1000 INJECTION INTRAMUSCULAR; INTRAVENOUS; SUBCUTANEOUS ONCE
Refills: 0 | Status: COMPLETED | OUTPATIENT
Start: 2024-03-21 | End: 2024-03-21

## 2024-03-21 RX ADMIN — Medication 10 MILLIGRAM(S): at 15:48

## 2024-03-21 RX ADMIN — Medication 975 MILLIGRAM(S): at 19:02

## 2024-03-21 RX ADMIN — Medication 15 MILLIGRAM(S): at 19:02

## 2024-03-21 RX ADMIN — Medication 50 MILLIGRAM(S): at 15:48

## 2024-03-21 RX ADMIN — SODIUM CHLORIDE 1000 MILLILITER(S): 9 INJECTION INTRAMUSCULAR; INTRAVENOUS; SUBCUTANEOUS at 15:48

## 2024-03-21 NOTE — ED ADULT NURSE NOTE - NSFALLHARMRISKINTERV_ED_ALL_ED

## 2024-03-21 NOTE — ED PROVIDER NOTE - NSFOLLOWUPINSTRUCTIONS_ED_ALL_ED_FT
You were evaluated in the emergency department for dizziness.  This was likely from your vertigo.  Increase your meclizine dose to 50 mg every 12 hours as needed for dizziness symptoms.  Follow-up with your neurologist for further evaluation and management.  Also see your primary care doctor in 1 week.    Dizziness    Dizziness can manifest as a feeling of unsteadiness or light-headedness. You may feel like you are about to faint. This condition can be caused by a number of things, including medicines, dehydration, or illness. Drink enough fluid to keep your urine clear or pale yellow. Do not drink alcohol and limit your caffeine intake. Avoid quick or sudden movements.  Rise slowly from chairs and steady yourself until you feel okay. In the morning, first sit up on the side of the bed.    SEEK IMMEDIATE MEDICAL CARE IF YOU HAVE ANY OF THE FOLLOWING SYMPTOMS: vomiting, changes in your vision or speech, weakness in your arms or legs, trouble speaking or swallowing, chest pain, abdominal pain, shortness of breath, sweating, bleeding, headache, neck pain, or fever.    Rest, drink plenty of fluids.  Advance activity as tolerated.  Continue all previously prescribed medications as directed.  Follow up with your primary care physician in 48-72 hours- bring copies of your results.  Return to the ER for worsening or persistent symptoms, and/or ANY NEW OR CONCERNING SYMPTOMS. If you have issues obtaining follow up, please call: [phone] to obtain a doctor or specialist who takes your insurance in your area. You were evaluated in the emergency department for dizziness.  This was likely from your vertigo. Your CT scans of your head did not show any acute abnormality. Increase your meclizine dose to 50 mg every 12 hours as needed for dizziness symptoms.  Follow-up with your neurologist for further evaluation and management.  Also see your primary care doctor in 1 week.    You may take 975 mg Tylenol (acetaminophen) every six hours as needed for pain.    Dizziness    Dizziness can manifest as a feeling of unsteadiness or light-headedness. You may feel like you are about to faint. This condition can be caused by a number of things, including medicines, dehydration, or illness. Drink enough fluid to keep your urine clear or pale yellow. Do not drink alcohol and limit your caffeine intake. Avoid quick or sudden movements.  Rise slowly from chairs and steady yourself until you feel okay. In the morning, first sit up on the side of the bed.    SEEK IMMEDIATE MEDICAL CARE IF YOU HAVE ANY OF THE FOLLOWING SYMPTOMS: vomiting, changes in your vision or speech, weakness in your arms or legs, trouble speaking or swallowing, chest pain, abdominal pain, shortness of breath, sweating, bleeding, headache, neck pain, or fever.    Rest, drink plenty of fluids.  Advance activity as tolerated.  Continue all previously prescribed medications as directed.  Follow up with your primary care physician in 48-72 hours- bring copies of your results.  Return to the ER for worsening or persistent symptoms, and/or ANY NEW OR CONCERNING SYMPTOMS. If you have issues obtaining follow up, please call: [phone] to obtain a doctor or specialist who takes your insurance in your area.

## 2024-03-21 NOTE — ED PROVIDER NOTE - OBJECTIVE STATEMENT
83-year-old female with PMH vertigo, HTN, HLD, A-fib on Eliquis and aspirin presents with vertiginous symptoms of dizziness since Saturday morning.  Patient states that on Saturday she had vertigo described as sensation of things in the room swaying back-and-forth similar to prior episodes of vertigo she has had.  Patient meclizine in the morning and afternoon and at night with some relief.  On Sunday patient's vertigo symptoms were more mild.  On Monday patient had no symptoms.  On Tuesday patient woke up with vertiginous symptoms and was.  Patient saw urgent care who gave her meclizine and Ativan thinking that patient might have been stressed or anxious.  Patient took Ativan yesterday without relief.  Patient took meclizine this morning 25 mg at 6 AM and took Ativan 0.5 mg at 1 PM without relief.  Patient also endorsed a mild headache earlier today that is mostly resolved now.  Patient also followed up with an ENT doctor and had testing done in her ears that was unremarkable.  Patient states that she felt weak" today.  Patient has been eating and drinking and endorses feeling more hungry than usual. Patient denies fevers, chest pain, shortness of breath, nausea at this time, vomiting, constipation, diarrhea, dysuria.

## 2024-03-21 NOTE — ED PROVIDER NOTE - PROGRESS NOTE DETAILS
[place], [facility] - 82yo woman with PMH A-fib on Eliquis and ASA, HTN, HLD Calvin Dempsey MD PGY-2: Pt reassessed. An hour ago when walking to bathroom felt slight dizziness however now states feels much better. No more dizziness. Gait with cane normal at this time down the hallway. Will allow IVF to finish and reassess. At this time will not pursue CTH imaging. Likely discharge home with neuro f/up. [place], [facility] - 84yo woman with PMH A-fib on Eliquis and ASA, HTN, HLD, Vertigo on meclizine 25 mg as needed, presenting due to increased intensity of her vertigo over the last 6 days associated with headache.  Patient went to see urgent care and they gave her Ativan with supposed anxiety component, has not alleviated her symptoms.   Denies fevers, chills, infectious symptoms, head trauma or syncope.    Neurologically intact with right sided nystagmus. Symptoms most likely vertiginous in nature however will get labs, symptomatically treat, reassess if symptoms improve, if not may resort to CTAs. Calvin Dempsey MD PGY-2: When going to discharge patient, she stated that just now walked back from the bathroom and felt nausea, headache with a brain fog like feeling but no room spinning dizziness. Would like further workup with CT scans which is reasonable. Will order CTH noncon, CTA head and neck

## 2024-03-21 NOTE — ED PROVIDER NOTE - ATTENDING CONTRIBUTION TO CARE
83F h/o vertigo, HTN, HLD, AFib on Eliquis p/w dizziness x1 wk. Noted swaying sensation at time of symptom onset, similat to prior episodes of vertigo. Took meclizine at home with transient relief. Symptoms were intermittent throughout the next 7 days, at times more mild than others. Had gone to urgent care who also gave ativan out of concern that her symptoms may more due to anxiety as she also reports a great deal of stress in recent weeks. Today took both meclizine and ativan with minimal relief prompting ED visit. Reports eating and drinking normally. No recent URI symptoms. No chest pain, sob, difficulty breathing, focal weakness/loss of sensation, vision changes, or other associated symptoms.   Gen: awake and alert, non-toxic appearing  HEENT: +R horizontal nystagmus, PEERL, EOMI  CV: rrr, no appreciable murmur  Pulm: clear lungs  Abd: soft, ntnd  Ext: no edema/swelling  Skin: no rash/petechiae/ecchymosis  Neuro: CN 2-12 grossly intact, motor 5/5 all extremities, sensation grossly intact, normal finger-to-nose, no pronator drift  MDM: 83F h/o vertigo, HTN, HLD, AFib on Eliquis p/w dizziness x1 wk, described as a swaying sensation which is similar to her prior vertigo episodes, though symptoms intermittent throughout the week and only transiently improved with meclizine. Based on descriptors and exam, symptoms appear due to peripheral vertigo. Will treat supportively with IVF and reglan, check electrolytes and renal function. If symptoms not improved with supportive care can obtain CTH to assess for central causes though exam does not appear c/w this diagnosis

## 2024-03-21 NOTE — ED PROVIDER NOTE - DATE/TIME 2
[email or web address]
21-Mar-2024 17:07

## 2024-03-21 NOTE — ED ADULT NURSE REASSESSMENT NOTE - NS ED NURSE REASSESS COMMENT FT1
Break RN: pt a&ox4, denying chest pain, sob, n+v, headache, dizziness. Breathing even, unlabored. Pt ambulatory to bathroom with cane; steady gait. Pending dispo. safety maintained.

## 2024-03-21 NOTE — ED ADULT TRIAGE NOTE - WEIGHT IN LBS
ABEBE MCNAMARA, 39y, Female  MRN-[id number]  Patient is a 39y old  Female who presents with a chief complaint of psych admission (01 Jul 2023 23:29)      OVERNIGHT EVENTS: NAEO     SUBJECTIVE: Pt seen/examined at bedside. Pt denying any acute complaints today.     12 Point ROS Negative unless noted otherwise above.  -------------------------------------------------------------------------------  VITAL SIGNS:  Vital Signs Last 24 Hrs  T(C): 36.5 (03 Jul 2023 06:08), Max: 36.8 (02 Jul 2023 18:42)  T(F): 97.7 (03 Jul 2023 06:08), Max: 98.2 (02 Jul 2023 18:42)  HR: 122 (03 Jul 2023 06:08) (122 - 130)  BP: 148/78 (03 Jul 2023 06:08) (148/78 - 156/86)  BP(mean): --  RR: 18 (03 Jul 2023 06:08) (18 - 18)  SpO2: 96% (03 Jul 2023 06:08) (96% - 98%)    Parameters below as of 03 Jul 2023 06:08  Patient On (Oxygen Delivery Method): room air      I&O's Summary      PHYSICAL EXAM:    General: NAD  HEENT: NC/AT; EOMI, PERRLA, anicteric sclera; moist mucosal membranes.  Neck: supple, trachea midline  Cardiovascular: RRR, +S1/S2; NO M/R/G  Respiratory: CTA B/L; no W/R/R  Gastrointestinal: soft, NT/ND; +BSx4  Extremities: WWP; no edema or cyanosis  Vascular: 2+ radial, DP/PT pulses B/L  Neurological: AAOx3; no focal deficits    ALLERGIES:  Allergies    amoxicillin (Rash)    Intolerances        MEDICATIONS:  MEDICATIONS  (STANDING):  ARIPiprazole 10 milliGRAM(s) Oral daily  nicotine -  14 mG/24Hr(s) Patch 1 Patch Transdermal daily  NIFEdipine XL 30 milliGRAM(s) Oral daily  QUEtiapine 50 milliGRAM(s) Oral at bedtime  QUEtiapine 50 milliGRAM(s) Oral once    MEDICATIONS  (PRN):  acetaminophen     Tablet .. 650 milliGRAM(s) Oral every 6 hours PRN Temp greater or equal to 38C (100.4F), Mild Pain (1 - 3), Moderate Pain (4 - 6), Severe Pain (7 - 10)  aluminum hydroxide/magnesium hydroxide/simethicone Suspension 30 milliLiter(s) Oral every 6 hours PRN Dyspepsia  benztropine 1 milliGRAM(s) Oral daily PRN Antipsychotic induced akathisia  diphenhydrAMINE 50 milliGRAM(s) Oral at bedtime PRN Insomnia  haloperidol     Tablet 5 milliGRAM(s) Oral every 6 hours PRN agitation  LORazepam     Tablet 2 milliGRAM(s) Oral every 6 hours PRN anxiety  magnesium hydroxide Suspension 30 milliLiter(s) Oral daily PRN Constipation  nicotine  Polacrilex Gum 2 milliGRAM(s) Oral every 2 hours PRN nrt      -------------------------------------------------------------------------------  LABS:                        8.1    6.91  )-----------( 327      ( 01 Jul 2023 20:04 )             28.5     07-01    135  |  105  |  6<L>  ----------------------------<  116<H>  4.6   |  20<L>  |  0.70    Ca    9.4      01 Jul 2023 20:04  Phos  4.0     07-01  Mg     1.9     07-01    TPro  8.1  /  Alb  4.1  /  TBili  0.2  /  DBili  x   /  AST  36  /  ALT  69<H>  /  AlkPhos  48  07-01    LIVER FUNCTIONS - ( 01 Jul 2023 20:04 )  Alb: 4.1 g/dL / Pro: 8.1 g/dL / ALK PHOS: 48 U/L / ALT: 69 U/L / AST: 36 U/L / GGT: x             Urinalysis Basic - ( 01 Jul 2023 20:04 )    Color: x / Appearance: x / SG: x / pH: x  Gluc: 116 mg/dL / Ketone: x  / Bili: x / Urobili: x   Blood: x / Protein: x / Nitrite: x   Leuk Esterase: x / RBC: x / WBC x   Sq Epi: x / Non Sq Epi: x / Bacteria: x      CAPILLARY BLOOD GLUCOSE              RADIOLOGY & ADDITIONAL TESTS: Reviewed.   149.9

## 2024-03-21 NOTE — ED PROVIDER NOTE - PHYSICAL EXAMINATION
Gen: well appearing, NAD  HEENT: no conjunctivitis  Cardiac: regular rate rhythm, normal S1S2  Chest: CTA BL, no wheeze or crackles  Abdomen: normal BS, soft, NT  Extremity: no gross deformity, good perfusion  Skin: no rash  NEURO: no gross motor or sensory deficits, CN2-12 grossly intact, normal speech, PERRL, EOMI, normal gait, AAOx3, slight right-beating nystagmus

## 2024-03-21 NOTE — ED ADULT TRIAGE NOTE - CHIEF COMPLAINT QUOTE
Pt states saturday she started to have vertigo.  pt states it has been intermittent since then.  Urgent care gave her ativan with her meclizine without relief.  Pt also c/o headache. Pt has a hx of vertigo, HTN, high cholesterol

## 2024-03-21 NOTE — ED PROVIDER NOTE - PATIENT PORTAL LINK FT
You can access the FollowMyHealth Patient Portal offered by [facility] by registering at the following website: [email or web address]. By joining [facility]’s FollowMyHealth portal, you will also be able to view your health information using other applications (apps) compatible with our system.

## 2024-03-21 NOTE — ED PROVIDER NOTE - CLINICAL SUMMARY MEDICAL DECISION MAKING FREE TEXT BOX
83-year-old female with PMH of vertigo, HTN, HLD, A-fib on Eliquis and aspirin presents with dysuria and vertiginous symptoms since Saturday.  Exam shows slight right beating nystagmus otherwise no focal neurologic deficits, normal gait with cane.  Will obtain labs, electrolytes, EKG, IV fluids, Reglan, meclizine and reassess.  If symptoms persist will obtain CT head and CTA head and neck with IV contrast.  Dispo pending results and clinical course.

## 2024-03-21 NOTE — ED ADULT NURSE NOTE - OBJECTIVE STATEMENT
Patient presented to ED with a chief complaint of vertigo since Saturday. Patient states that she took her normal dose of medication and her symptoms were not relieved. States that she went outpatient for further evaluation again with no relief. Denies n/v/d, chest pain, fever, chills, recent illness.    Patient is A/O x 4, NAD, skin intact, PERRLA, speech clear, neurologically intact with no deficits, strength b/l upper and lower extremities 5/5, sensation intact b/l upper and lower extremities, rate and rhythm regular S1 and S2 heard with no murmurs radial and pedal pulses present, capillary refill <3 , lungs clear b/l lobes throughout with no adventitious sounds or accessory muscle use, even and unlabored, abdomen soft and non-tender, bowel sounds heard x 4 quadrant, no edema noted. Safety maintained, bed in lowest position, call bell within reach, plan of care reviewed with patient , addressed all questions and concern, will continue to monitor patient.

## 2024-04-24 ENCOUNTER — APPOINTMENT (OUTPATIENT)
Dept: MRI IMAGING | Facility: IMAGING CENTER | Age: 84
End: 2024-04-24
Payer: MEDICARE

## 2024-04-24 ENCOUNTER — OUTPATIENT (OUTPATIENT)
Dept: OUTPATIENT SERVICES | Facility: HOSPITAL | Age: 84
LOS: 1 days | End: 2024-04-24
Payer: MEDICARE

## 2024-04-24 DIAGNOSIS — Z96.659 PRESENCE OF UNSPECIFIED ARTIFICIAL KNEE JOINT: Chronic | ICD-10-CM

## 2024-04-24 DIAGNOSIS — Z00.8 ENCOUNTER FOR OTHER GENERAL EXAMINATION: ICD-10-CM

## 2024-04-24 DIAGNOSIS — Z96.643 PRESENCE OF ARTIFICIAL HIP JOINT, BILATERAL: Chronic | ICD-10-CM

## 2024-04-24 PROCEDURE — 70553 MRI BRAIN STEM W/O & W/DYE: CPT

## 2024-04-24 PROCEDURE — 70553 MRI BRAIN STEM W/O & W/DYE: CPT | Mod: 26

## 2024-04-24 PROCEDURE — A9585: CPT

## 2024-05-09 RX ORDER — DEXLANSOPRAZOLE 60 MG/1
60 CAPSULE, DELAYED RELEASE ORAL
Qty: 90 | Refills: 1 | Status: ACTIVE | COMMUNITY
Start: 2023-03-06 | End: 1900-01-01

## 2024-05-29 NOTE — ED ADULT NURSE NOTE - CHIEF COMPLAINT
The patient is a 82y Female complaining of weakness. [FreeTextEntry1] : Dermatology follow-up Antibiotics for possible infection of area on buttock

## 2024-11-12 ENCOUNTER — LABORATORY RESULT (OUTPATIENT)
Age: 84
End: 2024-11-12

## 2024-11-12 ENCOUNTER — APPOINTMENT (OUTPATIENT)
Dept: PULMONOLOGY | Facility: CLINIC | Age: 84
End: 2024-11-12
Payer: MEDICARE

## 2024-11-12 VITALS
HEART RATE: 78 BPM | BODY MASS INDEX: 24.25 KG/M2 | SYSTOLIC BLOOD PRESSURE: 139 MMHG | DIASTOLIC BLOOD PRESSURE: 81 MMHG | TEMPERATURE: 97.3 F | WEIGHT: 145.56 LBS | RESPIRATION RATE: 16 BRPM | HEIGHT: 65 IN | OXYGEN SATURATION: 95 %

## 2024-11-12 DIAGNOSIS — R94.2 ABNORMAL RESULTS OF PULMONARY FUNCTION STUDIES: ICD-10-CM

## 2024-11-12 DIAGNOSIS — R53.82 CHRONIC FATIGUE, UNSPECIFIED: ICD-10-CM

## 2024-11-12 DIAGNOSIS — Z23 ENCOUNTER FOR IMMUNIZATION: ICD-10-CM

## 2024-11-12 DIAGNOSIS — Z00.00 ENCOUNTER FOR GENERAL ADULT MEDICAL EXAMINATION W/OUT ABNORMAL FINDINGS: ICD-10-CM

## 2024-11-12 DIAGNOSIS — E78.5 HYPERLIPIDEMIA, UNSPECIFIED: ICD-10-CM

## 2024-11-12 PROCEDURE — G0008: CPT

## 2024-11-12 PROCEDURE — 71046 X-RAY EXAM CHEST 2 VIEWS: CPT

## 2024-11-12 PROCEDURE — G2211 COMPLEX E/M VISIT ADD ON: CPT

## 2024-11-12 PROCEDURE — 90662 IIV NO PRSV INCREASED AG IM: CPT

## 2024-11-12 PROCEDURE — 36415 COLL VENOUS BLD VENIPUNCTURE: CPT

## 2024-11-12 PROCEDURE — 99214 OFFICE O/P EST MOD 30 MIN: CPT

## 2024-11-13 ENCOUNTER — NON-APPOINTMENT (OUTPATIENT)
Age: 84
End: 2024-11-13

## 2024-11-13 ENCOUNTER — RX RENEWAL (OUTPATIENT)
Age: 84
End: 2024-11-13

## 2024-11-13 DIAGNOSIS — R73.03 PREDIABETES.: ICD-10-CM

## 2024-11-13 LAB
25(OH)D3 SERPL-MCNC: 23.3 NG/ML
ALBUMIN SERPL ELPH-MCNC: 4.7 G/DL
ALP BLD-CCNC: 66 U/L
ALT SERPL-CCNC: 20 U/L
APPEARANCE: CLEAR
AST SERPL-CCNC: 32 U/L
BACTERIA: NEGATIVE /HPF
BASOPHILS # BLD AUTO: 0.04 K/UL
BASOPHILS NFR BLD AUTO: 0.6 %
BILIRUB DIRECT SERPL-MCNC: 0.1 MG/DL
BILIRUB INDIRECT SERPL-MCNC: 0.3 MG/DL
BILIRUB SERPL-MCNC: 0.4 MG/DL
BILIRUBIN URINE: NEGATIVE
BLOOD URINE: NEGATIVE
CAST: 0 /LPF
CHOLEST SERPL-MCNC: 167 MG/DL
COLOR: YELLOW
CREAT SPEC-SCNC: 86 MG/DL
EOSINOPHIL # BLD AUTO: 0.18 K/UL
EOSINOPHIL NFR BLD AUTO: 2.6 %
EPITHELIAL CELLS: 2 /HPF
ESTIMATED AVERAGE GLUCOSE: 137 MG/DL
GLUCOSE QUALITATIVE U: NEGATIVE MG/DL
HBA1C MFR BLD HPLC: 6.4 %
HCT VFR BLD CALC: 43 %
HDLC SERPL-MCNC: 67 MG/DL
HGB BLD-MCNC: 13.5 G/DL
IMM GRANULOCYTES NFR BLD AUTO: 0.4 %
KETONES URINE: NEGATIVE MG/DL
LDLC SERPL CALC-MCNC: 79 MG/DL
LEUKOCYTE ESTERASE URINE: ABNORMAL
LYMPHOCYTES # BLD AUTO: 1.72 K/UL
LYMPHOCYTES NFR BLD AUTO: 25.1 %
MAN DIFF?: NORMAL
MCHC RBC-ENTMCNC: 29.9 PG
MCHC RBC-ENTMCNC: 31.4 G/DL
MCV RBC AUTO: 95.3 FL
MICROALBUMIN 24H UR DL<=1MG/L-MCNC: <1.2 MG/DL
MICROALBUMIN/CREAT 24H UR-RTO: NORMAL MG/G
MICROSCOPIC-UA: NORMAL
MONOCYTES # BLD AUTO: 0.69 K/UL
MONOCYTES NFR BLD AUTO: 10.1 %
NEUTROPHILS # BLD AUTO: 4.19 K/UL
NEUTROPHILS NFR BLD AUTO: 61.2 %
NITRITE URINE: NEGATIVE
NONHDLC SERPL-MCNC: 100 MG/DL
PH URINE: 5.5
PLATELET # BLD AUTO: 264 K/UL
PROT SERPL-MCNC: 7 G/DL
PROTEIN URINE: NEGATIVE MG/DL
RBC # BLD: 4.51 M/UL
RBC # FLD: 13.5 %
RED BLOOD CELLS URINE: 1 /HPF
SPECIFIC GRAVITY URINE: 1.01
T3 SERPL-MCNC: 126 NG/DL
T3RU NFR SERPL: 1.1 TBI
T4 FREE SERPL-MCNC: 1.3 NG/DL
T4 SERPL-MCNC: 10.1 UG/DL
TRIGL SERPL-MCNC: 116 MG/DL
TSH SERPL-ACNC: 2.16 UIU/ML
UROBILINOGEN URINE: 0.2 MG/DL
WBC # FLD AUTO: 6.85 K/UL
WHITE BLOOD CELLS URINE: 4 /HPF

## 2024-11-13 RX ORDER — METFORMIN ER 500 MG 500 MG/1
500 TABLET ORAL DAILY
Qty: 90 | Refills: 0 | Status: ACTIVE | COMMUNITY
Start: 2024-11-13 | End: 1900-01-01

## 2024-11-14 LAB — BACTERIA UR CULT: NORMAL

## 2024-11-26 ENCOUNTER — OFFICE (OUTPATIENT)
Age: 84
Setting detail: OPHTHALMOLOGY
End: 2024-11-26
Payer: MEDICARE

## 2024-11-26 DIAGNOSIS — H26.493: ICD-10-CM

## 2024-11-26 DIAGNOSIS — H01.005: ICD-10-CM

## 2024-11-26 DIAGNOSIS — H18.523: ICD-10-CM

## 2024-11-26 DIAGNOSIS — H43.812: ICD-10-CM

## 2024-11-26 DIAGNOSIS — H04.122: ICD-10-CM

## 2024-11-26 DIAGNOSIS — H18.513: ICD-10-CM

## 2024-11-26 PROCEDURE — 92014 COMPRE OPH EXAM EST PT 1/>: CPT | Performed by: OPHTHALMOLOGY

## 2024-11-26 ASSESSMENT — CORNEAL DYSTROPHY - POSTERIOR
OD_POSTERIORDYSTROPHY: 2+ GUTTATA
OS_POSTERIORDYSTROPHY: 1+ GUTTATA

## 2024-11-26 ASSESSMENT — REFRACTION_CURRENTRX
OD_AXIS: 163
OD_CYLINDER: +1.00
OS_VPRISM_DIRECTION: BF
OS_SPHERE: +2.50
OD_VPRISM_DIRECTION: BF
OD_ADD: +3.00
OS_OVR_VA: 20/
OS_ADD: +3.00
OD_SPHERE: +2.75
OS_CYLINDER: +1.00
OS_AXIS: 171
OD_OVR_VA: 20/

## 2024-11-26 ASSESSMENT — LID EXAM ASSESSMENTS
OS_BLEPHARITIS: LLL 2+ 3+
OD_BLEPHARITIS: RLL 2+

## 2024-11-26 ASSESSMENT — REFRACTION_MANIFEST
OD_CYLINDER: -0.75
OS_SPHERE: +2.00
OD_SPHERE: +2.25
OS_VA1: 20/50
OS_CYLINDER: -0.50
OD_VA1: 20/40-
OD_AXIS: 090
OS_AXIS: 070

## 2024-11-26 ASSESSMENT — KERATOMETRY
OS_AXISANGLE_DEGREES: 066
OD_AXISANGLE_DEGREES: 114
OS_K1POWER_DIOPTERS: 42.00
OD_K2POWER_DIOPTERS: 42.75
OS_K2POWER_DIOPTERS: 43.25
METHOD_AUTO_MANUAL: AUTO
OD_K1POWER_DIOPTERS: 42.50

## 2024-11-26 ASSESSMENT — REFRACTION_AUTOREFRACTION
OS_AXIS: 160
OS_SPHERE: +2.00
OD_AXIS: 053
OD_CYLINDER: -1.00
OD_SPHERE: +0.25
OS_CYLINDER: -2.25

## 2024-11-26 ASSESSMENT — CONFRONTATIONAL VISUAL FIELD TEST (CVF)
OS_FINDINGS: FULL
OD_FINDINGS: FULL

## 2024-11-26 ASSESSMENT — VISUAL ACUITY
OD_BCVA: 20/30
OS_BCVA: 20/70+1

## 2024-11-26 ASSESSMENT — TONOMETRY
OS_IOP_MMHG: 11
OD_IOP_MMHG: 11

## 2024-11-26 ASSESSMENT — CORNEAL EDEMA CLINICAL DESCRIPTION: OD_CORNEALEDEMA: ABSENT

## 2025-01-21 ENCOUNTER — APPOINTMENT (OUTPATIENT)
Dept: PULMONOLOGY | Facility: CLINIC | Age: 85
End: 2025-01-21
Payer: MEDICARE

## 2025-01-21 VITALS
HEIGHT: 65 IN | WEIGHT: 144 LBS | SYSTOLIC BLOOD PRESSURE: 120 MMHG | TEMPERATURE: 98 F | HEART RATE: 93 BPM | BODY MASS INDEX: 23.99 KG/M2 | DIASTOLIC BLOOD PRESSURE: 75 MMHG | OXYGEN SATURATION: 100 %

## 2025-01-21 DIAGNOSIS — R94.2 ABNORMAL RESULTS OF PULMONARY FUNCTION STUDIES: ICD-10-CM

## 2025-01-21 LAB — SARS-COV-2 AG RESP QL IA.RAPID: POSITIVE

## 2025-01-21 PROCEDURE — 99214 OFFICE O/P EST MOD 30 MIN: CPT

## 2025-01-21 PROCEDURE — 36415 COLL VENOUS BLD VENIPUNCTURE: CPT

## 2025-01-21 PROCEDURE — 87811 SARS-COV-2 COVID19 W/OPTIC: CPT | Mod: QW

## 2025-01-21 PROCEDURE — 71046 X-RAY EXAM CHEST 2 VIEWS: CPT

## 2025-01-21 PROCEDURE — G2211 COMPLEX E/M VISIT ADD ON: CPT

## 2025-01-21 RX ORDER — BENZONATATE 100 MG/1
100 CAPSULE ORAL
Qty: 30 | Refills: 3 | Status: ACTIVE | COMMUNITY
Start: 2025-01-21 | End: 1900-01-01

## 2025-01-21 RX ORDER — MOLNUPIRAVIR 200 MG/1
200 CAPSULE ORAL
Qty: 40 | Refills: 0 | Status: ACTIVE | COMMUNITY
Start: 2025-01-21 | End: 1900-01-01

## 2025-01-22 ENCOUNTER — NON-APPOINTMENT (OUTPATIENT)
Age: 85
End: 2025-01-22

## 2025-01-22 LAB
ANION GAP SERPL CALC-SCNC: 12 MMOL/L
BASOPHILS # BLD AUTO: 0.03 K/UL
BASOPHILS NFR BLD AUTO: 0.5 %
BUN SERPL-MCNC: 16 MG/DL
CALCIUM SERPL-MCNC: 9.7 MG/DL
CHLORIDE SERPL-SCNC: 98 MMOL/L
CO2 SERPL-SCNC: 27 MMOL/L
CREAT SERPL-MCNC: 0.91 MG/DL
EGFR: 62 ML/MIN/1.73M2
EOSINOPHIL # BLD AUTO: 0.01 K/UL
EOSINOPHIL NFR BLD AUTO: 0.2 %
GLUCOSE SERPL-MCNC: 154 MG/DL
HCT VFR BLD CALC: 41.8 %
HGB BLD-MCNC: 13.5 G/DL
IMM GRANULOCYTES NFR BLD AUTO: 0.3 %
LYMPHOCYTES # BLD AUTO: 0.77 K/UL
LYMPHOCYTES NFR BLD AUTO: 12.8 %
MAN DIFF?: NORMAL
MCHC RBC-ENTMCNC: 31 PG
MCHC RBC-ENTMCNC: 32.3 G/DL
MCV RBC AUTO: 95.9 FL
MONOCYTES # BLD AUTO: 0.82 K/UL
MONOCYTES NFR BLD AUTO: 13.6 %
NEUTROPHILS # BLD AUTO: 4.36 K/UL
NEUTROPHILS NFR BLD AUTO: 72.6 %
PLATELET # BLD AUTO: 180 K/UL
POTASSIUM SERPL-SCNC: 3.7 MMOL/L
PROCALCITONIN SERPL-MCNC: 0.08 NG/ML
RBC # BLD: 4.36 M/UL
RBC # FLD: 13.5 %
RESP PATH DNA+RNA PNL NPH NAA+NON-PROBE: DETECTED
SARS-COV-2 RNA RESP QL NAA+PROBE: DETECTED
SODIUM SERPL-SCNC: 138 MMOL/L
WBC # FLD AUTO: 6.01 K/UL

## 2025-01-28 ENCOUNTER — APPOINTMENT (OUTPATIENT)
Dept: PULMONOLOGY | Facility: CLINIC | Age: 85
End: 2025-01-28
Payer: MEDICARE

## 2025-01-28 VITALS
SYSTOLIC BLOOD PRESSURE: 128 MMHG | BODY MASS INDEX: 24.99 KG/M2 | OXYGEN SATURATION: 95 % | DIASTOLIC BLOOD PRESSURE: 76 MMHG | HEIGHT: 65 IN | WEIGHT: 150 LBS | HEART RATE: 95 BPM | TEMPERATURE: 97.3 F

## 2025-01-28 DIAGNOSIS — R53.82 CHRONIC FATIGUE, UNSPECIFIED: ICD-10-CM

## 2025-01-28 DIAGNOSIS — R68.89 OTHER GENERAL SYMPTOMS AND SIGNS: ICD-10-CM

## 2025-01-28 DIAGNOSIS — R05.9 COUGH, UNSPECIFIED: ICD-10-CM

## 2025-01-28 DIAGNOSIS — U07.1 COVID-19: ICD-10-CM

## 2025-01-28 PROCEDURE — G2211 COMPLEX E/M VISIT ADD ON: CPT

## 2025-01-28 PROCEDURE — 71046 X-RAY EXAM CHEST 2 VIEWS: CPT

## 2025-01-28 PROCEDURE — 36415 COLL VENOUS BLD VENIPUNCTURE: CPT

## 2025-01-28 PROCEDURE — 99214 OFFICE O/P EST MOD 30 MIN: CPT

## 2025-01-28 RX ORDER — FAMOTIDINE 40 MG/1
40 TABLET, FILM COATED ORAL
Qty: 30 | Refills: 3 | Status: ACTIVE | COMMUNITY
Start: 2025-01-28 | End: 1900-01-01

## 2025-01-29 ENCOUNTER — NON-APPOINTMENT (OUTPATIENT)
Age: 85
End: 2025-01-29

## 2025-01-29 LAB
ANION GAP SERPL CALC-SCNC: 15 MMOL/L
BASOPHILS # BLD AUTO: 0.04 K/UL
BASOPHILS NFR BLD AUTO: 0.5 %
BUN SERPL-MCNC: 21 MG/DL
CALCIUM SERPL-MCNC: 9.8 MG/DL
CHLORIDE SERPL-SCNC: 104 MMOL/L
CO2 SERPL-SCNC: 23 MMOL/L
CREAT SERPL-MCNC: 1.06 MG/DL
EGFR: 51 ML/MIN/1.73M2
EOSINOPHIL # BLD AUTO: 0.17 K/UL
EOSINOPHIL NFR BLD AUTO: 2.2 %
GLUCOSE SERPL-MCNC: 167 MG/DL
HCT VFR BLD CALC: 41 %
HGB BLD-MCNC: 13.3 G/DL
IMM GRANULOCYTES NFR BLD AUTO: 1.3 %
LYMPHOCYTES # BLD AUTO: 1.95 K/UL
LYMPHOCYTES NFR BLD AUTO: 24.7 %
MAN DIFF?: NORMAL
MCHC RBC-ENTMCNC: 30.2 PG
MCHC RBC-ENTMCNC: 32.4 G/DL
MCV RBC AUTO: 93 FL
MONOCYTES # BLD AUTO: 0.74 K/UL
MONOCYTES NFR BLD AUTO: 9.4 %
NEUTROPHILS # BLD AUTO: 4.9 K/UL
NEUTROPHILS NFR BLD AUTO: 61.9 %
PLATELET # BLD AUTO: 312 K/UL
POTASSIUM SERPL-SCNC: 4.2 MMOL/L
PROCALCITONIN SERPL-MCNC: 0.03 NG/ML
RBC # BLD: 4.41 M/UL
RBC # FLD: 13.1 %
SODIUM SERPL-SCNC: 142 MMOL/L
TSH SERPL-ACNC: 1.57 UIU/ML
WBC # FLD AUTO: 7.9 K/UL

## 2025-02-05 ENCOUNTER — OFFICE (OUTPATIENT)
Facility: LOCATION | Age: 85
Setting detail: OPHTHALMOLOGY
End: 2025-02-05
Payer: MEDICARE

## 2025-02-05 DIAGNOSIS — H26.491: ICD-10-CM

## 2025-02-05 PROCEDURE — 66821 AFTER CATARACT LASER SURGERY: CPT | Mod: RT | Performed by: OPHTHALMOLOGY

## 2025-02-05 ASSESSMENT — REFRACTION_AUTOREFRACTION
OD_CYLINDER: -5.75
OS_CYLINDER: -0.75
OS_AXIS: 156
OD_SPHERE: -4.75
OD_AXIS: 072
OS_SPHERE: +1.25

## 2025-02-05 ASSESSMENT — TONOMETRY
OS_IOP_MMHG: 12
OD_IOP_MMHG: 11

## 2025-02-05 ASSESSMENT — REFRACTION_CURRENTRX
OD_CYLINDER: +1.00
OD_OVR_VA: 20/
OS_VPRISM_DIRECTION: BF
OS_OVR_VA: 20/
OS_ADD: +3.00
OS_CYLINDER: +1.00
OD_ADD: +3.00
OD_SPHERE: +2.75
OD_AXIS: 163
OD_VPRISM_DIRECTION: BF
OS_AXIS: 171
OS_SPHERE: +2.50

## 2025-02-05 ASSESSMENT — REFRACTION_MANIFEST
OD_VA1: 20/40-
OD_CYLINDER: -0.75
OS_SPHERE: +2.00
OD_SPHERE: +2.25
OS_AXIS: 070
OD_AXIS: 090
OS_CYLINDER: -0.50
OS_VA1: 20/50

## 2025-02-05 ASSESSMENT — CONFRONTATIONAL VISUAL FIELD TEST (CVF)
OD_FINDINGS: FULL
OS_FINDINGS: FULL

## 2025-02-05 ASSESSMENT — VISUAL ACUITY
OS_BCVA: 20/125
OD_BCVA: 20/25+2

## 2025-02-05 ASSESSMENT — KERATOMETRY
OS_AXISANGLE_DEGREES: 054
OD_K1POWER_DIOPTERS: 42.25
OS_K1POWER_DIOPTERS: 42.25
OD_K2POWER_DIOPTERS: 42.50
OD_AXISANGLE_DEGREES: 142
METHOD_AUTO_MANUAL: AUTO
OS_K2POWER_DIOPTERS: 43.75

## 2025-02-05 ASSESSMENT — CORNEAL DYSTROPHY - POSTERIOR
OS_POSTERIORDYSTROPHY: 1+ GUTTATA
OD_POSTERIORDYSTROPHY: 2+ GUTTATA

## 2025-02-05 ASSESSMENT — CORNEAL EDEMA CLINICAL DESCRIPTION: OD_CORNEALEDEMA: ABSENT

## 2025-02-10 ENCOUNTER — RX RENEWAL (OUTPATIENT)
Age: 85
End: 2025-02-10

## 2025-02-11 ENCOUNTER — APPOINTMENT (OUTPATIENT)
Dept: PULMONOLOGY | Facility: CLINIC | Age: 85
End: 2025-02-11
Payer: MEDICARE

## 2025-02-11 VITALS
HEART RATE: 95 BPM | DIASTOLIC BLOOD PRESSURE: 78 MMHG | OXYGEN SATURATION: 96 % | SYSTOLIC BLOOD PRESSURE: 126 MMHG | TEMPERATURE: 98 F | BODY MASS INDEX: 23.99 KG/M2 | HEIGHT: 65 IN | WEIGHT: 144 LBS

## 2025-02-11 DIAGNOSIS — U07.1 COVID-19: ICD-10-CM

## 2025-02-11 DIAGNOSIS — G47.33 OBSTRUCTIVE SLEEP APNEA (ADULT) (PEDIATRIC): ICD-10-CM

## 2025-02-11 DIAGNOSIS — R94.2 ABNORMAL RESULTS OF PULMONARY FUNCTION STUDIES: ICD-10-CM

## 2025-02-11 DIAGNOSIS — R73.03 PREDIABETES.: ICD-10-CM

## 2025-02-11 DIAGNOSIS — R05.9 COUGH, UNSPECIFIED: ICD-10-CM

## 2025-02-11 PROCEDURE — 94727 GAS DIL/WSHOT DETER LNG VOL: CPT

## 2025-02-11 PROCEDURE — 99214 OFFICE O/P EST MOD 30 MIN: CPT | Mod: 25

## 2025-02-11 PROCEDURE — 94010 BREATHING CAPACITY TEST: CPT

## 2025-02-11 PROCEDURE — 36415 COLL VENOUS BLD VENIPUNCTURE: CPT

## 2025-02-11 PROCEDURE — 94729 DIFFUSING CAPACITY: CPT

## 2025-02-11 PROCEDURE — ZZZZZ: CPT

## 2025-02-12 ENCOUNTER — OFFICE (OUTPATIENT)
Facility: LOCATION | Age: 85
Setting detail: OPHTHALMOLOGY
End: 2025-02-12
Payer: MEDICARE

## 2025-02-12 ENCOUNTER — RX ONLY (RX ONLY)
Age: 85
End: 2025-02-12

## 2025-02-12 DIAGNOSIS — H26.492: ICD-10-CM

## 2025-02-12 PROBLEM — H18.592 UNSPECIFIED HEREDITARY CORNEAL DYSTROPHIES ; RIGHT EYE: Status: ACTIVE | Noted: 2025-02-12

## 2025-02-12 PROBLEM — H18.593 UNSPECIFIED HEREDITARY CORNEAL DYSTROPHIES ; RIGHT EYE: Status: ACTIVE | Noted: 2025-02-12

## 2025-02-12 PROBLEM — H18.591 UNSPECIFIED HEREDITARY CORNEAL DYSTROPHIES ; RIGHT EYE: Status: ACTIVE | Noted: 2025-02-12

## 2025-02-12 PROCEDURE — 99024 POSTOP FOLLOW-UP VISIT: CPT | Performed by: OPHTHALMOLOGY

## 2025-02-12 ASSESSMENT — REFRACTION_CURRENTRX
OD_ADD: +2.75
OS_CYLINDER: -0.50
OS_SPHERE: +2.50
OD_AXIS: 163
OS_AXIS: 154
OD_OVR_VA: 20/
OD_SPHERE: +0.75
OD_CYLINDER: -0.50
OS_CYLINDER: +1.00
OS_SPHERE: +1.00
OD_CYLINDER: +1.00
OD_VPRISM_DIRECTION: BF
OS_ADD: +3.00
OS_OVR_VA: 20/
OD_VPRISM_DIRECTION: BF
OS_OVR_VA: 20/
OS_AXIS: 171
OD_OVR_VA: 20/
OD_AXIS: 018
OD_SPHERE: +2.75
OD_ADD: +3.00
OS_VPRISM_DIRECTION: BF
OS_VPRISM_DIRECTION: BF
OS_ADD: +2.75

## 2025-02-12 ASSESSMENT — TONOMETRY
OS_IOP_MMHG: 12
OD_IOP_MMHG: 16

## 2025-02-12 ASSESSMENT — REFRACTION_AUTOREFRACTION
OS_AXIS: 171
OD_CYLINDER: -0.75
OD_SPHERE: -+1.50
OS_CYLINDER: -0.75
OS_SPHERE: +1.75
OD_AXIS: 080

## 2025-02-12 ASSESSMENT — REFRACTION_MANIFEST
OS_VA1: 20/50
OD_SPHERE: +2.25
OD_AXIS: 090
OS_AXIS: 070
OS_CYLINDER: -0.50
OS_SPHERE: +2.00
OD_VA1: 20/40-
OD_CYLINDER: -0.75

## 2025-02-12 ASSESSMENT — KERATOMETRY
OS_AXISANGLE_DEGREES: 049
OS_K1POWER_DIOPTERS: 41.50
OD_AXISANGLE_DEGREES: 146
METHOD_AUTO_MANUAL: AUTO
OD_K2POWER_DIOPTERS: 42.50
OD_K1POWER_DIOPTERS: 42.25
OS_K2POWER_DIOPTERS: 43.25

## 2025-02-12 ASSESSMENT — CORNEAL DYSTROPHY - POSTERIOR
OD_POSTERIORDYSTROPHY: 2+ GUTTATA
OS_POSTERIORDYSTROPHY: 1+ GUTTATA

## 2025-02-12 ASSESSMENT — CONFRONTATIONAL VISUAL FIELD TEST (CVF)
OD_FINDINGS: FULL
OS_FINDINGS: FULL

## 2025-02-12 ASSESSMENT — VISUAL ACUITY
OD_BCVA: 20/25-1
OS_BCVA: 20/30-1

## 2025-02-13 ENCOUNTER — NON-APPOINTMENT (OUTPATIENT)
Age: 85
End: 2025-02-13

## 2025-02-13 LAB
ESTIMATED AVERAGE GLUCOSE: 131 MG/DL
HBA1C MFR BLD HPLC: 6.2 %

## 2025-02-25 ENCOUNTER — RX RENEWAL (OUTPATIENT)
Age: 85
End: 2025-02-25

## 2025-05-13 ENCOUNTER — APPOINTMENT (OUTPATIENT)
Dept: PULMONOLOGY | Facility: CLINIC | Age: 85
End: 2025-05-13
Payer: MEDICARE

## 2025-05-13 VITALS
HEIGHT: 64 IN | DIASTOLIC BLOOD PRESSURE: 68 MMHG | RESPIRATION RATE: 15 BRPM | HEART RATE: 82 BPM | SYSTOLIC BLOOD PRESSURE: 105 MMHG | OXYGEN SATURATION: 95 % | BODY MASS INDEX: 25.61 KG/M2 | WEIGHT: 150 LBS

## 2025-05-13 DIAGNOSIS — R73.03 PREDIABETES.: ICD-10-CM

## 2025-05-13 DIAGNOSIS — E78.5 HYPERLIPIDEMIA, UNSPECIFIED: ICD-10-CM

## 2025-05-13 DIAGNOSIS — R94.2 ABNORMAL RESULTS OF PULMONARY FUNCTION STUDIES: ICD-10-CM

## 2025-05-13 PROCEDURE — 95012 NITRIC OXIDE EXP GAS DETER: CPT

## 2025-05-13 PROCEDURE — ZZZZZ: CPT

## 2025-05-13 PROCEDURE — 94729 DIFFUSING CAPACITY: CPT

## 2025-05-13 PROCEDURE — 99214 OFFICE O/P EST MOD 30 MIN: CPT | Mod: 25

## 2025-05-13 PROCEDURE — 94010 BREATHING CAPACITY TEST: CPT

## 2025-05-13 PROCEDURE — 94727 GAS DIL/WSHOT DETER LNG VOL: CPT

## 2025-05-28 ENCOUNTER — APPOINTMENT (OUTPATIENT)
Dept: GASTROENTEROLOGY | Facility: CLINIC | Age: 85
End: 2025-05-28
Payer: MEDICARE

## 2025-05-28 VITALS
BODY MASS INDEX: 25.61 KG/M2 | DIASTOLIC BLOOD PRESSURE: 78 MMHG | SYSTOLIC BLOOD PRESSURE: 131 MMHG | HEIGHT: 64 IN | TEMPERATURE: 97.9 F | WEIGHT: 150 LBS | RESPIRATION RATE: 15 BRPM | OXYGEN SATURATION: 97 % | HEART RATE: 84 BPM

## 2025-05-28 DIAGNOSIS — K21.9 GASTRO-ESOPHAGEAL REFLUX DISEASE W/OUT ESOPHAGITIS: ICD-10-CM

## 2025-05-28 DIAGNOSIS — I48.91 UNSPECIFIED ATRIAL FIBRILLATION: ICD-10-CM

## 2025-05-28 DIAGNOSIS — R14.0 ABDOMINAL DISTENSION (GASEOUS): ICD-10-CM

## 2025-05-28 DIAGNOSIS — G47.33 OBSTRUCTIVE SLEEP APNEA (ADULT) (PEDIATRIC): ICD-10-CM

## 2025-05-28 DIAGNOSIS — Z12.11 ENCOUNTER FOR SCREENING FOR MALIGNANT NEOPLASM OF COLON: ICD-10-CM

## 2025-05-28 PROCEDURE — 99204 OFFICE O/P NEW MOD 45 MIN: CPT

## 2025-05-28 RX ORDER — SODIUM PICOSULFATE, MAGNESIUM OXIDE, AND ANHYDROUS CITRIC ACID 12; 3.5; 1 G/175ML; G/175ML; MG/175ML
10-3.5-12 MG-GM LIQUID ORAL
Qty: 1 | Refills: 0 | Status: ACTIVE | COMMUNITY
Start: 2025-05-28 | End: 1900-01-01

## 2025-07-01 ENCOUNTER — APPOINTMENT (OUTPATIENT)
Dept: PULMONOLOGY | Facility: CLINIC | Age: 85
End: 2025-07-01
Payer: MEDICARE

## 2025-07-01 VITALS
BODY MASS INDEX: 25.61 KG/M2 | HEART RATE: 94 BPM | SYSTOLIC BLOOD PRESSURE: 125 MMHG | DIASTOLIC BLOOD PRESSURE: 81 MMHG | OXYGEN SATURATION: 95 % | HEIGHT: 64 IN | TEMPERATURE: 97.7 F | WEIGHT: 150 LBS

## 2025-07-01 PROBLEM — Z01.811 PREOP PULMONARY/RESPIRATORY EXAM: Status: ACTIVE | Noted: 2025-07-01

## 2025-07-01 PROBLEM — Z01.818 PREOP EXAMINATION: Status: ACTIVE | Noted: 2025-07-01

## 2025-07-01 PROCEDURE — 71046 X-RAY EXAM CHEST 2 VIEWS: CPT

## 2025-07-01 PROCEDURE — 99214 OFFICE O/P EST MOD 30 MIN: CPT | Mod: 25

## 2025-07-01 PROCEDURE — 95012 NITRIC OXIDE EXP GAS DETER: CPT

## 2025-07-01 PROCEDURE — 94010 BREATHING CAPACITY TEST: CPT

## 2025-07-01 PROCEDURE — 36415 COLL VENOUS BLD VENIPUNCTURE: CPT

## 2025-07-01 RX ORDER — FLUTICASONE FUROATE 100 UG/1
100 POWDER RESPIRATORY (INHALATION)
Qty: 1 | Refills: 3 | Status: ACTIVE | COMMUNITY
Start: 2025-07-01 | End: 1900-01-01

## 2025-07-02 ENCOUNTER — NON-APPOINTMENT (OUTPATIENT)
Age: 85
End: 2025-07-02

## 2025-07-02 LAB
ALBUMIN SERPL ELPH-MCNC: 4.3 G/DL
ALP BLD-CCNC: 74 U/L
ALT SERPL-CCNC: 29 U/L
ANION GAP SERPL CALC-SCNC: 15 MMOL/L
AST SERPL-CCNC: 36 U/L
BASOPHILS # BLD AUTO: 0.05 K/UL
BASOPHILS NFR BLD AUTO: 0.7 %
BILIRUB SERPL-MCNC: 0.6 MG/DL
BUN SERPL-MCNC: 20 MG/DL
CALCIUM SERPL-MCNC: 10.4 MG/DL
CHLORIDE SERPL-SCNC: 105 MMOL/L
CO2 SERPL-SCNC: 22 MMOL/L
CREAT SERPL-MCNC: 0.98 MG/DL
EGFRCR SERPLBLD CKD-EPI 2021: 57 ML/MIN/1.73M2
EOSINOPHIL # BLD AUTO: 0.19 K/UL
EOSINOPHIL NFR BLD AUTO: 2.7 %
ESTIMATED AVERAGE GLUCOSE: 126 MG/DL
GLUCOSE SERPL-MCNC: 129 MG/DL
HBA1C MFR BLD HPLC: 6 %
HCT VFR BLD CALC: 41 %
HGB BLD-MCNC: 13 G/DL
IMM GRANULOCYTES NFR BLD AUTO: 0.4 %
LYMPHOCYTES # BLD AUTO: 1.43 K/UL
LYMPHOCYTES NFR BLD AUTO: 20.3 %
MAN DIFF?: NORMAL
MCHC RBC-ENTMCNC: 29.8 PG
MCHC RBC-ENTMCNC: 31.7 G/DL
MCV RBC AUTO: 94 FL
MONOCYTES # BLD AUTO: 0.72 K/UL
MONOCYTES NFR BLD AUTO: 10.2 %
NEUTROPHILS # BLD AUTO: 4.63 K/UL
NEUTROPHILS NFR BLD AUTO: 65.7 %
PLATELET # BLD AUTO: 272 K/UL
POTASSIUM SERPL-SCNC: 4.3 MMOL/L
PROT SERPL-MCNC: 6.7 G/DL
RBC # BLD: 4.36 M/UL
RBC # FLD: 13.5 %
SODIUM SERPL-SCNC: 142 MMOL/L
WBC # FLD AUTO: 7.05 K/UL

## 2025-07-22 ENCOUNTER — APPOINTMENT (OUTPATIENT)
Dept: GASTROENTEROLOGY | Facility: AMBULATORY MEDICAL SERVICES | Age: 85
End: 2025-07-22
Payer: MEDICARE

## 2025-07-22 PROCEDURE — 45385 COLONOSCOPY W/LESION REMOVAL: CPT | Mod: PT

## 2025-07-22 PROCEDURE — 43239 EGD BIOPSY SINGLE/MULTIPLE: CPT | Mod: 59

## 2025-08-12 ENCOUNTER — APPOINTMENT (OUTPATIENT)
Dept: PULMONOLOGY | Facility: CLINIC | Age: 85
End: 2025-08-12
Payer: MEDICARE

## 2025-08-12 VITALS
HEIGHT: 64 IN | RESPIRATION RATE: 15 BRPM | OXYGEN SATURATION: 94 % | WEIGHT: 150 LBS | HEART RATE: 92 BPM | BODY MASS INDEX: 25.61 KG/M2 | DIASTOLIC BLOOD PRESSURE: 73 MMHG | SYSTOLIC BLOOD PRESSURE: 105 MMHG

## 2025-08-12 DIAGNOSIS — G47.33 OBSTRUCTIVE SLEEP APNEA (ADULT) (PEDIATRIC): ICD-10-CM

## 2025-08-12 DIAGNOSIS — R94.2 ABNORMAL RESULTS OF PULMONARY FUNCTION STUDIES: ICD-10-CM

## 2025-08-12 DIAGNOSIS — J98.4 OTHER DISORDERS OF LUNG: ICD-10-CM

## 2025-08-12 DIAGNOSIS — R73.03 PREDIABETES.: ICD-10-CM

## 2025-08-12 PROCEDURE — 99214 OFFICE O/P EST MOD 30 MIN: CPT | Mod: 25

## 2025-08-12 PROCEDURE — 94727 GAS DIL/WSHOT DETER LNG VOL: CPT

## 2025-08-12 PROCEDURE — 94618 PULMONARY STRESS TESTING: CPT

## 2025-08-12 PROCEDURE — 94729 DIFFUSING CAPACITY: CPT

## 2025-08-12 PROCEDURE — 94010 BREATHING CAPACITY TEST: CPT

## 2025-08-15 ENCOUNTER — OFFICE (OUTPATIENT)
Facility: LOCATION | Age: 85
Setting detail: OPHTHALMOLOGY
End: 2025-08-15
Payer: MEDICARE

## 2025-08-15 DIAGNOSIS — H18.523: ICD-10-CM

## 2025-08-15 DIAGNOSIS — H18.513: ICD-10-CM

## 2025-08-15 DIAGNOSIS — H01.005: ICD-10-CM

## 2025-08-15 DIAGNOSIS — H04.122: ICD-10-CM

## 2025-08-15 DIAGNOSIS — H35.441: ICD-10-CM

## 2025-08-15 DIAGNOSIS — H26.492: ICD-10-CM

## 2025-08-15 DIAGNOSIS — H43.812: ICD-10-CM

## 2025-08-15 PROCEDURE — 92014 COMPRE OPH EXAM EST PT 1/>: CPT | Performed by: STUDENT IN AN ORGANIZED HEALTH CARE EDUCATION/TRAINING PROGRAM

## 2025-08-15 ASSESSMENT — REFRACTION_CURRENTRX
OS_VPRISM_DIRECTION: BF
OD_SPHERE: +2.75
OD_ADD: +2.75
OS_CYLINDER: -0.50
OD_VPRISM_DIRECTION: BF
OS_SPHERE: +1.00
OD_CYLINDER: +1.00
OS_OVR_VA: 20/
OD_CYLINDER: -0.50
OS_ADD: +3.00
OS_ADD: +2.75
OS_OVR_VA: 20/
OD_OVR_VA: 20/
OS_AXIS: 154
OS_AXIS: 171
OD_OVR_VA: 20/
OS_CYLINDER: +1.00
OS_SPHERE: +2.50
OS_VPRISM_DIRECTION: BF
OD_VPRISM_DIRECTION: BF
OD_AXIS: 163
OD_AXIS: 018
OD_SPHERE: +0.75
OD_ADD: +3.00

## 2025-08-15 ASSESSMENT — KERATOMETRY
OS_K1POWER_DIOPTERS: 41.50
OD_AXISANGLE_DEGREES: 146
OD_K2POWER_DIOPTERS: 42.50
OS_AXISANGLE_DEGREES: 049
OS_K2POWER_DIOPTERS: 43.25
OD_K1POWER_DIOPTERS: 42.25
METHOD_AUTO_MANUAL: AUTO

## 2025-08-15 ASSESSMENT — REFRACTION_AUTOREFRACTION
OS_SPHERE: +1.75
OD_SPHERE: -+1.50
OS_AXIS: 171
OD_AXIS: 080
OS_CYLINDER: -0.75
OD_CYLINDER: -0.75

## 2025-08-15 ASSESSMENT — CORNEAL DYSTROPHY - POSTERIOR
OD_POSTERIORDYSTROPHY: 2+ GUTTATA
OS_POSTERIORDYSTROPHY: 1+ GUTTATA

## 2025-08-15 ASSESSMENT — CONFRONTATIONAL VISUAL FIELD TEST (CVF)
OD_FINDINGS: FULL
OS_FINDINGS: FULL

## 2025-08-15 ASSESSMENT — LID EXAM ASSESSMENTS
OS_BLEPHARITIS: LLL 2+ 3+
OD_BLEPHARITIS: RLL 2+

## 2025-08-15 ASSESSMENT — VISUAL ACUITY
OD_BCVA: 20/50+2
OS_BCVA: 20/30-2

## 2025-08-15 ASSESSMENT — REFRACTION_MANIFEST
OD_SPHERE: +2.25
OD_AXIS: 090
OS_VA1: 20/50
OS_AXIS: 070
OD_VA1: 20/40-
OS_SPHERE: +2.00
OS_CYLINDER: -0.50
OD_CYLINDER: -0.75

## 2025-08-15 ASSESSMENT — TONOMETRY
OD_IOP_MMHG: 12
OS_IOP_MMHG: 12